# Patient Record
Sex: FEMALE | Race: WHITE | NOT HISPANIC OR LATINO | Employment: FULL TIME | URBAN - METROPOLITAN AREA
[De-identification: names, ages, dates, MRNs, and addresses within clinical notes are randomized per-mention and may not be internally consistent; named-entity substitution may affect disease eponyms.]

---

## 2018-01-11 NOTE — MISCELLANEOUS
Message  Return to work or school:    She is able to return to work on  4/6/16   She is not able to return to work until 4/6/16     Patient will be out of work 4/3-4/4 and 4/5/16 back to work 4/6/16  Signatures   Electronically signed by : LISA Townsend;  Apr 5 2016 12:43PM EST                       (Author)

## 2018-01-11 NOTE — RESULT NOTES
Verified Results  [facility]) Ambiguous Test Order [id number] 12:00AM Mateus Ballesteros     Test Name Result Flag Reference   Ambiguous Test Order Comment     Report delayed in order to contact you to clarify requested test(s)    SPOKE TO OMID ANTOINETTE FROM YOUR FACILITY ON 3-8-2016  ADD TEST  [id number] [facility]

## 2018-01-12 NOTE — MISCELLANEOUS
Message  Return to work or school:   Huey Toney is under my professional care   She was seen in my office on 1/19/16   She is able to return to work on  1/24/16   She is not able to return to work until 1/24/16           Signatures   Electronically signed by : LISA Martinez; Jan 19 2016 12:57PM EST                       (Author)

## 2018-01-12 NOTE — MISCELLANEOUS
Message  Return to work or school:    She is able to return to work on  4/6/16   She is not able to return to work until 4/6/16     Patient will be out of work 4/3-4/4 and 4/5/15 back on 4/6/16  Signatures   Electronically signed by : LISA Merrill;  Apr 5 2016 12:43PM EST                       (Author)

## 2018-01-15 NOTE — MISCELLANEOUS
Message  Return to work or school:    She is able to return to work on  4/6/16   She is not able to return to work until 4/6/16     Patient is under my care from 4-3/4-4 and 4/5/16  LISA Hernandez  Signatures   Electronically signed by : Dedrick Marr, ; Apr 4 2016  4:11PM EST                       (Author)    Electronically signed by : LISA Hernandez;  Apr 5 2016 12:47PM EST                       (Author)

## 2018-01-15 NOTE — MISCELLANEOUS
Message  Return to work or school:    She is able to return to work on  4/22/16   She is not able to return to work until 4/22/16           Signatures   Electronically signed by : LISA Iqbal;  Apr 21 2016  2:48PM EST                       (Author)

## 2018-01-16 NOTE — MISCELLANEOUS
Signatures   Electronically signed by : LISA Gabriel;  Apr 5 2016  8:55AM EST                       (Author)

## 2018-01-17 NOTE — PROGRESS NOTES
Assessment    1  Acute bacterial sinusitis (461 9) (J01 90)   2  Backache (724 5) (M54 9)    Plan  Acute bacterial sinusitis    · Doxycycline Hyclate 100 MG Oral Capsule; TAKE 1 CAPSULE EVERY 12 HOURS  DAILY   · Fluticasone Propionate 50 MCG/ACT Nasal Suspension; USE 2 SPRAYS IN EACH  NOSTRIL ONCE DAILY   · Guaifenesin-Codeine 100-10 MG/5ML Oral Solution; TAKE 10 ML EVERY 4 TO 6  HOURS AS NEEDED   · Apply warm moist compresses to the affected area 3 times a day for 5 minutes ;  Status:Complete;   Done: 76DRN9441   · Drink at least 6 glasses of water or juice a day ; Status:Complete;   Done: 56SXX6129   · How to use a nasal spray:; Status:Complete;   Done: 86PPB3647   · Irrigate your nose twice a day ; Status:Complete;   Done: 82UQW3350   · Taking a hot steamy shower may help your condition  This can be done as often as you  like ; Status:Complete;   Done: 23PUY4457   · Follow Up if Not Better Evaluation and Treatment  Follow-up  Status: Complete  Done:  80ZTB2576   · Call [phone] if: The sinus pain is not better in 5 days ; Status:Complete;   Done:  75GAI9818   · Call [phone] if: Your sinus pain is worse ; Status:Complete;   Done: 50LFT5757  Backache    · Cyclobenzaprine HCl - 10 MG Oral Tablet; TAKE 1 TABLET DAILY  Backache, Lower back pain    · TiZANidine HCl - 4 MG Oral Tablet    Discussion/Summary    Start antibiotic, take with food probiotic nasal spray cough med, supportive care, push fluids rest, return for worsening no improvement , muscle relaxer switched , reassurance provided  The patient was counseled regarding diagnostic results, instructions for management, risk factor reductions, prognosis, patient and family education, impressions, risks and benefits of treatment options, importance of compliance with treatment  Possible side effects of new medications were reviewed with the patient/guardian today  The treatment plan was reviewed with the patient/guardian   The patient/guardian understands and agrees with the treatment plan      Chief Complaint  Sinus congestion      History of Present Illness  HPI: post nasal drip hoarseness cough , non smoker used nyquil       Cold Symptoms: Aleksey Check presents with complaints of cold symptoms  Associated symptoms include sneezing, nasal congestion, post nasal drainage, sore throat, productive cough, facial pressure, facial pain, headache, plugged ear(s) and ear pain, but no fever  Review of Systems    Constitutional: as noted in HPI    ENT: as noted in HPI  Cardiovascular: no complaints of slow or fast heart rate, no chest pain, no palpitations, no leg claudication or lower extremity edema  Respiratory: cough, but no wheezing and no shortness of breath during exertion  Gastrointestinal: no complaints of abdominal pain, no constipation, no nausea or diarrhea, no vomiting, no bloody stools  Genitourinary: no complaints of dysuria, no incontinence, no pelvic pain, no dysmenorrhea, no vaginal discharge or abnormal vaginal bleeding  Musculoskeletal: myalgias and upper back pain  Active Problems    1  Anxiety (300 00) (F41 9)   2  History of Anxiety disorder (300 00) (F41 9)   3  Backache (724 5) (M54 9)   4  Cervical high risk HPV (human papillomavirus) test positive (795 05) (R87 810)   5  Dental abscess (522 5) (K04 7)   6  Encounter for screening mammogram for malignant neoplasm of breast (V76 12)   (Z12 31)   7  Hemorrhoid (455 6) (K64 9)   8  Herpes simplex type 1 infection (054 9) (B00 9)   9  History of Hirsutism (704 1) (L68 0)   10  Insomnia (780 52) (G47 00)   11  LGSIL (low grade squamous intraepithelial dysplasia) (796 9) (R89 6)   12  Lower back pain (724 2) (M54 5)   13  Lump or mass in breast (611 72) (N63)   14  Malaise and fatigue (780 79) (R53 81,R53 83)   15  Post traumatic stress disorder (309 81) (F43 10)   16  History of Post traumatic stress disorder (309 81) (F43 10)   17   Screening for malignant neoplasm of cervix (V76 2) (Z12 4)   18  Spasm of muscle (728 85) (M62 838)   19  History of Symptoms concerning nutrition, metabolism, and development (783 9)    (R63 8)   20  Vitamin B12 deficiency anemia (281 1) (D51 9)    Past Medical History    1  History of Abnormal weight gain (783 1) (R63 5)   2  History of Acute upper respiratory infection (465 9) (J06 9)   3  History of Anxiety disorder (300 00) (F41 9)   4  History of Chronic allergic conjunctivitis (372 14) (H10 45)   5  History of Cough (786 2) (R05)   6  History of Hirsutism (704 1) (L68 0)   7  History of abdominal pain (V13 89) (Z87 898)   8  History of acute bronchitis (V12 69) (Z87 09)   9  History of allergic rhinitis (V12 69) (Z87 09)   10  History of backache (V13 59) (Z87 39)   11  History of blepharitis (V12 49) (Z86 69)   12  History of conjunctivitis (V12 49) (Z86 69)   13  History of insomnia (V13 89) (Z87 898)   14  History of low back pain (V13 59) (Z87 39)   15  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)   16  History of Post traumatic stress disorder (309 81) (F43 10)   17  History of Symptoms concerning nutrition, metabolism, and development (783 9)    (R63 8)  Active Problems And Past Medical History Reviewed: The active problems and past medical history were reviewed and updated today  Family History    1  Family history of    2  Family history of cerebral aneurysm (V17 1) (Z82 49)    3  Family history of Brain Cancer (V16 8)   4  Family history of Breast Cancer (V16 3)  Family History Reviewed: The family history was reviewed and updated today  Social History    · Denied: History of Alcohol Use (History)   · Current Every Day Smoker (305 1)   · Denied: History of Drug use   · Single  The social history was reviewed and updated today  The social history was reviewed and is unchanged  Surgical History    1  History of Biopsy Breast Open   2  History of Colposcopy With Loop Electrode Excision Of The Cervix   3  History of Cryosurgical Ablation Of Fibroadenoma   4  History of Hysteroscopy With Endometrial Ablation   5  History of Tubal Ligation  Surgical History Reviewed: The surgical history was reviewed and updated today  Current Meds   1  Acyclovir 5 % External Ointment; APPLY A SMALL AMOUNT 3 TIMES DAILY AS   DIRECTED; Therapy: 41GKN6573 to (Evaluate:17Nov2014)  Requested for: 46UKZ1024; Last   Rx:27Rac5366 Ordered   2  Allegra-D 12 Hour  MG TB12; TAKE 1 TABLET DAILY AS NEEDED; Therapy: ([id number]) to Recorded   3  ClonazePAM 0 5 MG Oral Tablet; TAKE 1 TABLET EVERY 12 HOURS AS NEEDED; Therapy: 47YJC3209 to (Evaluate:97Bwh0256); Last Rx:16Oct2015 Ordered   4  Cyclobenzaprine HCl - 10 MG Oral Tablet; TAKE 1 TABLET TWICE DAILY  Requested   for: 26Ebf9430; Last Rx:97Jme5595 Ordered   5  Fluticasone Propionate 50 MCG/ACT Nasal Suspension; USE 2 SPRAYS IN EACH   NOSTRIL ONCE DAILY  Requested for: 66NPS3724; Last Rx:06Mar2015 Ordered   6  Ibuprofen TABS; TAKE AS NEEDED; Therapy: (Recorded:69Bio9711) to Recorded   7  MethylPREDNISolone (Dionte) 4 MG Oral Tablet; TAKE AS DIRECTED ON PATIENT   INSTRUCTION CARD; Therapy: 06LHM7712 to (Last Rx:16Oct2015)  Requested for: 16Oct2015 Ordered   8  Polymyxin B-Trimethoprim 40943-5 1 UNIT/ML-% Ophthalmic Solution; INSTILL 2   DROPS INTO AFFECTED EYE(S) 4 TIMES DAILY; Therapy: 72IUY2162 to (Evaluate:26Klv7579)  Requested for: 68VVZ8796; Last   Rx:93Dzk5075 Ordered   9  Refresh Optive SOLN;   Therapy: (BQGXBRLY:19ZZA6851) to Recorded   10  TiZANidine HCl - 4 MG Oral Tablet; TAKE 3 TABLET Bedtime; Therapy: 43VCG6454 to (Last Rx:16Oct2015)  Requested for: 16Oct2015 Ordered   11  Voltaren 1 % Transdermal Gel; Apply topically to affected area twice daily; Therapy: 87JUZ0717 to (Evaluate:49Tck9123)  Requested for: 36YRC9605; Last    Rx:16Oct2015 Ordered    The medication list was reviewed and updated today  Allergies    1  Penicillins   2   Vicodin TABS    Physical Exam    Constitutional   General appearance: Abnormal     Eyes   Conjunctiva and lids: No swelling, erythema or discharge  Ears, Nose, Mouth, and Throat   External inspection of ears and nose: Normal     Otoscopic examination: Abnormal   The right tympanic membrane had a diminished light reflex  The left tympanic membrane had a diminished light reflex  Nasal mucosa, septum, and turbinates: Abnormal   The bilateral nasal mucosa was boggy, edematous and red  Oropharynx: Abnormal   The posterior pharynx was erythematous, had an exudate and mucous  Pulmonary   Respiratory effort: No increased work of breathing or signs of respiratory distress  Auscultation of lungs: Clear to auscultation   (minimal upper airway secretions ) no rhonchi  no wheezing  Cardiovascular   Auscultation of heart: Normal rate and rhythm, normal S1 and S2, without murmurs  Abdomen   Abdomen: Non-tender, no masses  Lymphatic   Palpation of lymph nodes in neck: No lymphadenopathy  Musculoskeletal   Gait and station: Normal     Digits and nails: Normal without clubbing or cyanosis  Inspection/palpation of joints, bones, and muscles: Abnormal   Appearance - normal  Palpation - tenderness (cervical para spinal muscle spasm)  Skin   Skin and subcutaneous tissue: Normal without rashes or lesions  Psychiatric   Orientation to person, place, and time: Normal     Mood and affect: Normal          Signatures   Electronically signed by : LISA Morgan; Jan 20 2016  6:32PM EST                       (Author)    Electronically signed by :  Trinidad Caldera DO; Jan 24 2016  7:59PM EST

## 2018-01-18 NOTE — RESULT NOTES
Verified Results  (LC) EBV, Chronic/Active Infection [id number] 12:00AM Hay Feliz     Test Name Result Flag Reference   EBV Early Antigen Ab, IgG <9 0 U/mL  0 0-8 9   Negative        < 9 0                                                  Equivocal  9 0 - 10 9                                                  Positive        >10 9   EBV Ab VCA, IgG 262 0 U/mL H 0 0-17 9   Negative        <18 0                                                  Equivocal 18 0 - 21 9                                                  Positive        >21 9   EBV Nuclear Antigen Ab, IgG 383 0 U/mL H 0 0-17 9   Negative        <18 0                                                  Equivocal 18 0 - 21 9                                                  Positive        >21 9   Interpretation: Comment     EBV Interpretation Chart                 Interpretation   EBV-IgM  EA(D)-IgG  VCA-IgG  EBNA-IgG                 EBV Seronegative    -        -         -          -                 Early Phase         +        -         -          -                 Acute Primary       +       +or-       +          -                 Infection                 Convalescence/Past  -       +or-       +          +                 Infection                 Reactivated        +or-      +         +          +                 Infection                        + Antibody Present      - Antibody Absent

## 2020-01-31 ENCOUNTER — OFFICE VISIT (OUTPATIENT)
Dept: OBGYN CLINIC | Facility: CLINIC | Age: 45
End: 2020-01-31
Payer: COMMERCIAL

## 2020-01-31 VITALS — WEIGHT: 239 LBS | SYSTOLIC BLOOD PRESSURE: 120 MMHG | BODY MASS INDEX: 35.29 KG/M2 | DIASTOLIC BLOOD PRESSURE: 70 MMHG

## 2020-01-31 DIAGNOSIS — N76.0 BACTERIAL VAGINOSIS: Primary | ICD-10-CM

## 2020-01-31 DIAGNOSIS — B96.89 BACTERIAL VAGINOSIS: Primary | ICD-10-CM

## 2020-01-31 PROCEDURE — 99202 OFFICE O/P NEW SF 15 MIN: CPT | Performed by: NURSE PRACTITIONER

## 2020-01-31 RX ORDER — METRONIDAZOLE 7.5 MG/G
1 GEL VAGINAL
Qty: 70 G | Refills: 0 | Status: SHIPPED | OUTPATIENT
Start: 2020-01-31 | End: 2020-02-05

## 2020-01-31 NOTE — ASSESSMENT & PLAN NOTE
Wet mount clue cells presents, absent yeast and trich  Rx for Metrogel nightly x 5 nights sent to pharmacy  Will call with results and follow up accordingly  Reviewed loose cotton clothing  Loose cotton underwear, avoid thongs  Change promptly out of wet bathing suits or sweaty clothing  No scented products vaginally  Acidophilous probiotic daily  RTO annual exam or sooner as needed

## 2020-01-31 NOTE — PROGRESS NOTES
Assessment/Plan:    Bacterial vaginosis  Wet mount clue cells presents, absent yeast and trich  Rx for Metrogel nightly x 5 nights sent to pharmacy  Will call with results and follow up accordingly  Reviewed loose cotton clothing  Loose cotton underwear, avoid thongs  Change promptly out of wet bathing suits or sweaty clothing  No scented products vaginally  Acidophilous probiotic daily  RTO annual exam or sooner as needed  Diagnoses and all orders for this visit:    Bacterial vaginosis  -     metroNIDAZOLE (METROGEL) 0 75 % vaginal gel; Insert 1 application into the vagina daily at bedtime for 5 days          Subjective:      Patient ID: Gloria Smith is a 50 y o  female  Pt presents as new patient with complaints of vaginal infection  Symptoms began a few weeks ago  Tried Acidophilous but has not been helping  Gets Bacterial vaginosis frequently      + Itching  - Burning  + Abnormal discharge  + Odor  - Pelvic pain  - Irregular bleeding    Denies any new products vaginally  Would like STD testing  LMP: 2005  Had endometrial ablation due to menorrhagia  Last annual exam and pap smear; 2015    OB History  G 4 P 4    History of genital warts in 2015, thinks started coming back  The following portions of the patient's history were reviewed and updated as appropriate: allergies, current medications, past family history, past medical history, past social history, past surgical history and problem list     Review of Systems   All other systems reviewed and are negative  Objective:      /70   Wt 108 kg (239 lb)   LMP 01/31/2005 Comment: ETA-Ablation   Breastfeeding No   BMI 35 29 kg/m²          Physical Exam   Constitutional: She is oriented to person, place, and time  She appears well-developed and well-nourished  HENT:   Head: Normocephalic and atraumatic  Cardiovascular: Normal rate, regular rhythm and normal heart sounds     Pulmonary/Chest: Effort normal and breath sounds normal    Abdominal: Soft  Bowel sounds are normal  She exhibits no distension and no mass  There is no tenderness  There is no rebound and no guarding  Genitourinary: Vagina normal and uterus normal  No labial fusion  There is no rash, tenderness, lesion or injury on the right labia  There is no rash, tenderness, lesion or injury on the left labia  Cervix exhibits no motion tenderness, no discharge and no friability  Right adnexum displays no mass, no tenderness and no fullness  Left adnexum displays no mass, no tenderness and no fullness  Musculoskeletal: Normal range of motion  Neurological: She is alert and oriented to person, place, and time  Skin: Skin is warm and dry  Psychiatric: She has a normal mood and affect   Her behavior is normal  Judgment and thought content normal

## 2020-02-27 ENCOUNTER — OFFICE VISIT (OUTPATIENT)
Dept: FAMILY MEDICINE CLINIC | Facility: CLINIC | Age: 45
End: 2020-02-27
Payer: COMMERCIAL

## 2020-02-27 VITALS
TEMPERATURE: 98.8 F | OXYGEN SATURATION: 97 % | HEART RATE: 71 BPM | DIASTOLIC BLOOD PRESSURE: 82 MMHG | HEIGHT: 68 IN | RESPIRATION RATE: 16 BRPM | SYSTOLIC BLOOD PRESSURE: 120 MMHG | WEIGHT: 239.6 LBS | BODY MASS INDEX: 36.31 KG/M2

## 2020-02-27 DIAGNOSIS — Z13.29 SCREENING FOR THYROID DISORDER: ICD-10-CM

## 2020-02-27 DIAGNOSIS — F41.8 DEPRESSION WITH ANXIETY: ICD-10-CM

## 2020-02-27 DIAGNOSIS — B96.89 BACTERIAL VAGINOSIS: ICD-10-CM

## 2020-02-27 DIAGNOSIS — L70.9 ACNE, UNSPECIFIED ACNE TYPE: ICD-10-CM

## 2020-02-27 DIAGNOSIS — Z13.6 SCREENING FOR CARDIOVASCULAR CONDITION: ICD-10-CM

## 2020-02-27 DIAGNOSIS — N76.0 BACTERIAL VAGINOSIS: ICD-10-CM

## 2020-02-27 DIAGNOSIS — Z00.00 ANNUAL PHYSICAL EXAM: Primary | ICD-10-CM

## 2020-02-27 DIAGNOSIS — Z11.4 SCREENING FOR HIV (HUMAN IMMUNODEFICIENCY VIRUS): ICD-10-CM

## 2020-02-27 PROCEDURE — 99386 PREV VISIT NEW AGE 40-64: CPT | Performed by: NURSE PRACTITIONER

## 2020-02-27 RX ORDER — CLINDAMYCIN AND BENZOYL PEROXIDE 10; 50 MG/G; MG/G
GEL TOPICAL 2 TIMES DAILY
Qty: 50 G | Refills: 1 | Status: SHIPPED | OUTPATIENT
Start: 2020-02-27

## 2020-02-27 RX ORDER — METRONIDAZOLE 500 MG/1
500 TABLET ORAL EVERY 12 HOURS SCHEDULED
Qty: 14 TABLET | Refills: 0 | Status: SHIPPED | OUTPATIENT
Start: 2020-02-27 | End: 2020-03-05

## 2020-02-27 NOTE — PATIENT INSTRUCTIONS
Wellness Visit for Adults   WHAT YOU NEED TO KNOW:   What is a wellness visit? A wellness visit is when you see your healthcare provider to get screened for health problems  You can also get advice on how to stay healthy  Write down your questions so you remember to ask them  Ask your healthcare provider how often you should have a wellness visit  What happens at a wellness visit? Your healthcare provider will ask about your health, and your family history of health problems  This includes high blood pressure, heart disease, and cancer  He or she will ask if you have symptoms that concern you, if you smoke, and about your mood  You may also be asked about your intake of medicines, supplements, food, and alcohol  Any of the following may be done:  · Your weight  will be checked  Your height may also be checked so your body mass index (BMI) can be calculated  Your BMI shows if you are at a healthy weight  · Your blood pressure  and heart rate will be checked  Your temperature may also be checked  · Blood and urine tests  may be done  Blood tests may be done to check your cholesterol levels  Abnormal cholesterol levels increase your risk for heart disease and stroke  You may also need a blood or urine test to check for diabetes if you are at increased risk  Urine tests may be done to look for signs of an infection or kidney disease  · A physical exam  includes checking your heartbeat and lungs with a stethoscope  Your healthcare provider may also check your skin to look for sun damage  · Screening tests  may be recommended  A screening test is done to check for diseases that may not cause symptoms  The screening tests you may need depend on your age, gender, family history, and lifestyle habits  For example, colorectal screening may be recommended if you are 48years old or older  What screening tests do I need if I am a woman? · A Pap smear  is used to screen for cervical cancer   Pap smears are usually done every 3 to 5 years depending on your age  You may need them more often if you have had abnormal Pap smear test results in the past  Ask your healthcare provider how often you should have a Pap smear  · A mammogram  is an x-ray of your breasts to screen for breast cancer  Experts recommend mammograms every 2 years starting at age 48 years  You may need a mammogram at age 52 years or younger if you have an increased risk for breast cancer  Talk to your healthcare provider about when you should start having mammograms and how often you need them  What vaccines might I need? · Get an influenza vaccine  every year  The influenza vaccine protects you from the flu  Several types of viruses cause the flu  The viruses change over time, so new vaccines are made each year  · Get a tetanus-diphtheria (Td) booster vaccine  every 10 years  This vaccine protects you against tetanus and diphtheria  Tetanus is a severe infection that may cause painful muscle spasms and lockjaw  Diphtheria is a severe bacterial infection that causes a thick covering in the back of your mouth and throat  · Get a human papillomavirus (HPV) vaccine  if you are female and aged 23 to 32 or male 23 to 24 and never received it  This vaccine protects you from HPV infection  HPV is the most common infection spread by sexual contact  HPV may also cause vaginal, penile, and anal cancers  · Get a pneumococcal vaccine  if you are aged 72 years or older  The pneumococcal vaccine is an injection given to protect you from pneumococcal disease  Pneumococcal disease is an infection caused by pneumococcal bacteria  The infection may cause pneumonia, meningitis, or an ear infection  · Get a shingles vaccine  if you are aged 61 or older, even if you have had shingles before  The shingles vaccine is an injection to protect you from the varicella-zoster virus  This is the same virus that causes chickenpox   Shingles is a painful rash that develops in people who had chickenpox or have been exposed to the virus  How can I eat healthy? My Plate is a model for planning healthy meals  It shows the types and amounts of foods that should go on your plate  Fruits and vegetables make up about half of your plate, and grains and protein make up the other half  A serving of dairy is included on the side of your plate  The amount of calories and serving sizes you need depends on your age, gender, weight, and height  Examples of healthy foods are listed below:  · Eat a variety of vegetables  such as dark green, red, and orange vegetables  You can also include canned vegetables low in sodium (salt) and frozen vegetables without added butter or sauces  · Eat a variety of fresh fruits , canned fruit in 100% juice, frozen fruit, and dried fruit  · Include whole grains  At least half of the grains you eat should be whole grains  Examples include whole-wheat bread, wheat pasta, brown rice, and whole-grain cereals such as oatmeal     · Eat a variety of protein foods such as seafood (fish and shellfish), lean meat, and poultry without skin (turkey and chicken)  Examples of lean meats include pork leg, shoulder, or tenderloin, and beef round, sirloin, tenderloin, and extra lean ground beef  Other protein foods include eggs and egg substitutes, beans, peas, soy products, nuts, and seeds  · Choose low-fat dairy products such as skim or 1% milk or low-fat yogurt, cheese, and cottage cheese  · Limit unhealthy fats  such as butter, hard margarine, and shortening  How much exercise do I need? Exercise at least 30 minutes per day on most days of the week  Some examples of exercise include walking, biking, dancing, and swimming  You can also fit in more physical activity by taking the stairs instead of the elevator or parking farther away from stores  Include muscle strengthening activities 2 days each week  Regular exercise provides many health benefits  It helps you manage your weight, and decreases your risk for type 2 diabetes, heart disease, stroke, and high blood pressure  Exercise can also help improve your mood  Ask your healthcare provider about the best exercise plan for you  What are some general health and safety guidelines I should follow? · Do not smoke  Nicotine and other chemicals in cigarettes and cigars can cause lung damage  Ask your healthcare provider for information if you currently smoke and need help to quit  E-cigarettes or smokeless tobacco still contain nicotine  Talk to your healthcare provider before you use these products  · Limit alcohol  A drink of alcohol is 12 ounces of beer, 5 ounces of wine, or 1½ ounces of liquor  · Lose weight, if needed  Being overweight increases your risk of certain health conditions  These include heart disease, high blood pressure, type 2 diabetes, and certain types of cancer  · Protect your skin  Do not sunbathe or use tanning beds  Use sunscreen with a SPF 15 or higher  Apply sunscreen at least 15 minutes before you go outside  Reapply sunscreen every 2 hours  Wear protective clothing, hats, and sunglasses when you are outside  · Drive safely  Always wear your seatbelt  Make sure everyone in your car wears a seatbelt  A seatbelt can save your life if you are in an accident  Do not use your cell phone when you are driving  This could distract you and cause an accident  Pull over if you need to make a call or send a text message  · Practice safe sex  Use latex condoms if are sexually active and have more than one partner  Your healthcare provider may recommend screening tests for sexually transmitted infections (STIs)  · Wear helmets, lifejackets, and protective gear  Always wear a helmet when you ride a bike or motorcycle, go skiing, or play sports that could cause a head injury  Wear protective equipment when you play sports   Wear a lifejacket when you are on a boat or doing water sports  CARE AGREEMENT:   You have the right to help plan your care  Learn about your health condition and how it may be treated  Discuss treatment options with your caregivers to decide what care you want to receive  You always have the right to refuse treatment  The above information is an  only  It is not intended as medical advice for individual conditions or treatments  Talk to your doctor, nurse or pharmacist before following any medical regimen to see if it is safe and effective for you  © 2017 [address] [employer] Information is for End User's use only and may not be sold, redistributed or otherwise used for commercial purposes  All illustrations and images included in CareNotes® are the copyrighted property of [employer]  or [employer]

## 2020-02-27 NOTE — PROGRESS NOTES
FAMILY PRACTICE HEALTH MAINTENANCE OFFICE VISIT  [facility] - [facility]    NAME: Gloria Smith  AGE: 50 y o  SEX: female  : 1975     DATE: 2020    Assessment and Plan   Will do blood work and will call with results  Will follow up with gynecologist for yearly  Checked on good Rx and flagyl tabs only $8 and made aware about possible complications as vaginosis is not improved and cannot afford gel  1  Annual physical exam  -     Comprehensive metabolic panel; Future  -     CBC and Platelet; Future  -     Comprehensive metabolic panel  -     CBC and Platelet    2  Screening for cardiovascular condition  -     Comprehensive metabolic panel; Future  -     Lipid Panel with Direct LDL reflex; Future  -     Comprehensive metabolic panel  -     Lipid Panel with Direct LDL reflex    3  Screening for thyroid disorder  -     TSH, 3rd generation with Free T4 reflex; Future  -     TSH, 3rd generation with Free T4 reflex    4  Depression with anxiety  -     Comprehensive metabolic panel; Future  -     CBC and Platelet; Future  -     TSH, 3rd generation with Free T4 reflex; Future  -     Comprehensive metabolic panel  -     CBC and Platelet  -     TSH, 3rd generation with Free T4 reflex    5  Screening for HIV (human immunodeficiency virus)  -     [facility]- Human Immunodeficiency Virus 1/2 Antigen / Antibody ( Fourth Generation) with Reflex Testing; Future    6  Acne, unspecified acne type  -     clindamycin-benzoyl peroxide (BENZACLIN) gel; Apply topically 2 (two) times a day    7  Bacterial vaginosis  -     metroNIDAZOLE (FLAGYL) 500 mg tablet;  Take 1 tablet (500 mg total) by mouth every 12 (twelve) hours for 7 days        · Patient Counseling:   · Nutrition: Stressed importance of a well balanced diet, moderation of sodium/saturated fat, caloric balance and sufficient intake of fiber  · Exercise: Stressed the importance of regular exercise with a goal of 150 minutes per week  · Dental Health: Discussed daily flossing and brushing and regular dental visits   · Sexuality: Discussed sexually transmitted infections, use of condoms and prevention of unintended pregnancy  · Alcohol Use:  Recommended moderation of alcohol intake  · Injury Prevention: Discussed Safety Belts, Safety Helmets, and Smoke Detectors    · Immunizations reviewed: Declined recommended vaccinations  · Discussed benefits of:  Mammogram , Cervical Cancer screening and Screening labs   BMI Counseling: Body mass index is 36 97 kg/m²  Discussed with patient's BMI with her  The BMI is above normal  Nutrition recommendations include reducing portion sizes, decreasing overall calorie intake, 3-5 servings of fruits/vegetables daily, reducing fast food intake, consuming healthier snacks, decreasing soda and/or juice intake, moderation in carbohydrate intake, increasing intake of lean protein, reducing intake of saturated fat and trans fat and reducing intake of cholesterol  Exercise recommendations include exercising 3-5 times per week, joining a gym and strength training exercises  Return in about 1 year (around 2/27/2021) for Annual physical         Chief Complaint   No chief complaint on file  History of Present Illness     HPI  New to practice  Personal and family medical history reviewed  Quit smoking in June 2019  Under care of therapy currently and will be seeing psychiatrist at [facility] on 3/27/2020  Stated that did not have insurance for couple of years and not uptodate on anything  Went recently to gynecologist for bacterial vaginosis but could not afford flagyl get as costs$100          Well Adult Physical   Patient here for a comprehensive physical exam       Diet and Physical Activity  Diet: well balanced diet  Exercise: occasionally      Depression Screen  PHQ-9 Depression Screening    PHQ-9:    Frequency of the following problems over the past two weeks:       Little interest or pleasure in doing things:  0 - not at all  Feeling down, depressed, or hopeless:  1 - several days  PHQ-2 Score:  1          General Health  Hearing: Normal:  bilateral  Vision: no vision problems, most recent eye exam >1 year and wears glasses  last exam was in 2013  Vision screening discussed and will follow up with opthalmologist  Dental: no dental visits for >1 year and brushes teeth twice daily    Reproductive Health  Follows with gynecologist and no menstruation and had ablation in 2019   Denies being sexually active  The following portions of the patient's history were reviewed and updated as appropriate: allergies, current medications, past family history, past medical history, past social history, past surgical history and problem list     Review of Systems     Review of Systems   Constitutional: Negative  HENT: Negative  Eyes: Negative  Respiratory: Negative  Cardiovascular: Negative  Gastrointestinal: Negative  Endocrine: Negative  Genitourinary: Negative  Musculoskeletal: Negative  Skin: Negative  Allergic/Immunologic: Negative  Neurological: Negative  Hematological: Negative  Psychiatric/Behavioral: Negative  Past Medical History     Past Medical History:   Diagnosis Date    Abnormal Pap smear of cervix 2015    Blepharitis     Breast lump     last assessed: 3/18/2014    Depression 2011    EBV seropositivity     last assessed: 3/18/2016    Herpes simplex infection     last assessed: 8/19/2014    Hirsutism     HPV (human papilloma virus) infection 2015    Malaise and fatigue     last assessed: 3/18/2016    Multiple joint pain     last assessed: 3/1/2016    Myalgia     last assessed: 3/1/2016    Trauma     Since childhood       Past Surgical History     Past Surgical History:   Procedure Laterality Date    BREAST BIOPSY  2010    Benign    BREAST FIBROADENOMA SURGERY      COLONOSCOPY  2004    COLPOSCOPY  2003 ? ?     Hemorrhoids    HYSTEROSCOPY W/ ENDOMETRIAL ABLATION      TOOTH EXTRACTION      TUBAL LIGATION         Social History     Social History     Socioeconomic History    Marital status: Single     Spouse name: None    Number of children: None    Years of education: None    Highest education level: None   Occupational History    None   Social Needs    Financial resource strain: None    Food insecurity:     Worry: None     Inability: None    Transportation needs:     Medical: None     Non-medical: None   Tobacco Use    Smoking status: Former Smoker     Packs/day: 1 00     Years: 15 00     Pack years: 15 00     Types: Cigarettes     Start date: 10/31/1991     Last attempt to quit: 2019     Years since quittin 6    Smokeless tobacco: Never Used   Substance and Sexual Activity    Alcohol use:  Yes     Alcohol/week: 7 0 standard drinks     Types: 7 Glasses of wine per week    Drug use: Never    Sexual activity: Not Currently     Partners: Male     Birth control/protection: None   Lifestyle    Physical activity:     Days per week: None     Minutes per session: None    Stress: None   Relationships    Social connections:     Talks on phone: None     Gets together: None     Attends Confucianist service: None     Active member of club or organization: None     Attends meetings of clubs or organizations: None     Relationship status: None    Intimate partner violence:     Fear of current or ex partner: None     Emotionally abused: None     Physically abused: None     Forced sexual activity: None   Other Topics Concern    None   Social History Narrative    None       Family History     Family History   Problem Relation Age of Onset    Breast cancer Maternal Grandmother     Arthritis Maternal Grandmother     Cerebral aneurysm Mother     Brain cancer Family        Current Medications       Current Outpatient Medications:     clindamycin-benzoyl peroxide (BENZACLIN) gel, Apply topically 2 (two) times a day, Disp: 50 g, Rfl: 1    metroNIDAZOLE (FLAGYL) 500 mg tablet, Take 1 tablet (500 mg total) by mouth every 12 (twelve) hours for 7 days, Disp: 14 tablet, Rfl: 0     Allergies     Allergies   Allergen Reactions    Hydrocodone-Acetaminophen Abdominal Pain    Penicillins Itching       Objective     /82   Pulse 71   Temp 98 8 °F (37 1 °C)   Resp 16   Ht 5' 7 5" (1 715 m)   Wt 109 kg (239 lb 9 6 oz)   LMP 01/31/2005 Comment: ETA-Ablation   SpO2 97%   BMI 36 97 kg/m²      Physical Exam   Constitutional: She is oriented to person, place, and time  She appears well-developed and well-nourished  HENT:   Head: Normocephalic  Right Ear: Tympanic membrane, external ear and ear canal normal    Left Ear: Tympanic membrane, external ear and ear canal normal    Nose: Nose normal  Right sinus exhibits no maxillary sinus tenderness and no frontal sinus tenderness  Left sinus exhibits no maxillary sinus tenderness and no frontal sinus tenderness  Mouth/Throat: Oropharynx is clear and moist and mucous membranes are normal    Eyes: Pupils are equal, round, and reactive to light  Conjunctivae and lids are normal    Neck: Normal range of motion and full passive range of motion without pain  Neck supple  Carotid bruit is not present  No thyromegaly present  Cardiovascular: Normal rate, regular rhythm, normal heart sounds and intact distal pulses  No murmur heard  Pulses:       Radial pulses are 2+ on the right side, and 2+ on the left side  Femoral pulses are 2+ on the right side, and 2+ on the left side  Dorsalis pedis pulses are 2+ on the right side, and 2+ on the left side  Posterior tibial pulses are 2+ on the right side, and 2+ on the left side  Pulmonary/Chest: Effort normal and breath sounds normal    Abdominal: Soft  Normal appearance and bowel sounds are normal  There is no hepatomegaly  There is no tenderness  There is no rebound and no CVA tenderness  No hernia   Hernia confirmed negative in the ventral area, confirmed negative in the right inguinal area and confirmed negative in the left inguinal area  Genitourinary:   Genitourinary Comments:  and breast exam deferred as stated that scheduled for physical with her gynecologist     Musculoskeletal: Normal range of motion  She exhibits no edema, tenderness or deformity  Lymphadenopathy:        Right cervical: No superficial cervical and no posterior cervical adenopathy present  Left cervical: No superficial cervical and no posterior cervical adenopathy present  She has no axillary adenopathy  No inguinal adenopathy noted on the right or left side  Right: No inguinal and no supraclavicular adenopathy present  Left: No inguinal and no supraclavicular adenopathy present  Neurological: She is alert and oriented to person, place, and time  She has normal strength and normal reflexes  No sensory deficit  Coordination and gait normal  GCS eye subscore is 4  GCS verbal subscore is 5  GCS motor subscore is 6  Skin: Skin is warm, dry and intact  Psychiatric: She has a normal mood and affect  Her speech is normal and behavior is normal  Judgment and thought content normal  Cognition and memory are normal    Vitals reviewed           Visual Acuity Screening    Right eye Left eye Both eyes   Without correction:      With correction: 20/25 20/25 20/20           [address]

## 2020-03-20 ENCOUNTER — TELEMEDICINE (OUTPATIENT)
Dept: FAMILY MEDICINE CLINIC | Facility: CLINIC | Age: 45
End: 2020-03-20
Payer: COMMERCIAL

## 2020-03-20 DIAGNOSIS — H01.003 BLEPHARITIS OF BOTH EYES, UNSPECIFIED EYELID, UNSPECIFIED TYPE: ICD-10-CM

## 2020-03-20 DIAGNOSIS — N76.0 BACTERIAL VAGINOSIS: Primary | ICD-10-CM

## 2020-03-20 DIAGNOSIS — B96.89 BACTERIAL VAGINOSIS: Primary | ICD-10-CM

## 2020-03-20 DIAGNOSIS — F41.9 ANXIETY: ICD-10-CM

## 2020-03-20 DIAGNOSIS — H01.006 BLEPHARITIS OF BOTH EYES, UNSPECIFIED EYELID, UNSPECIFIED TYPE: ICD-10-CM

## 2020-03-20 PROCEDURE — 1036F TOBACCO NON-USER: CPT | Performed by: NURSE PRACTITIONER

## 2020-03-20 PROCEDURE — 99213 OFFICE O/P EST LOW 20 MIN: CPT | Performed by: NURSE PRACTITIONER

## 2020-03-20 RX ORDER — METRONIDAZOLE 7.5 MG/G
1 GEL VAGINAL DAILY
Qty: 70 G | Refills: 0 | Status: SHIPPED | OUTPATIENT
Start: 2020-03-20 | End: 2020-03-25

## 2020-03-20 RX ORDER — HYDROXYZINE HYDROCHLORIDE 25 MG/1
TABLET, FILM COATED ORAL
Qty: 60 TABLET | Refills: 0 | Status: SHIPPED | OUTPATIENT
Start: 2020-03-20 | End: 2020-05-08 | Stop reason: ALTCHOICE

## 2020-03-20 RX ORDER — MOXIFLOXACIN 5 MG/ML
1 SOLUTION/ DROPS OPHTHALMIC 3 TIMES DAILY
Qty: 3 ML | Refills: 0 | Status: SHIPPED | OUTPATIENT
Start: 2020-03-20 | End: 2020-03-27

## 2020-03-20 NOTE — PROGRESS NOTES
Virtual Brief Visit    Reason for visit is vaginal discharge and eye issues and also anxiety      Encounter provider HEMALATHA Balderas    Provider located at [address]  [address] [facility]  [address]  [place] [address]      Recent Visits  No visits were found meeting these conditions  Showing recent visits within past 7 days and meeting all other requirements     Future Appointments  No visits were found meeting these conditions  Showing future appointments within next 150 days and meeting all other requirements        Patient agrees to participate in a virtual check in via telephone or video visit instead of presenting to the office to address urgent/immediate medical needs  Patient is aware this is a billable service  After connecting through telephone, the patient was identified by name and date of birth  Lary Elmore was informed that this was a telemedicine visit and that the visit is being conducted through telephone which may not be secure and therefore might not be HIPAA-compliant  My office door was closed  No one else was in the room  She acknowledged consent and understanding of privacy and security of the virtual check-in visit  I informed the patient that I have reviewed her record in Epic and presented the opportunity for her to ask any questions regarding the visit today  The patient initiated communication and agreed to participate  Subjective  Lary Elmore is a 50 y o  female stated that was seen by gynecologist on 1/31/20 and was diagnosed with bacterial vaginosis and flagyl cream was prescribed but did not get it as had the insurance issue  Then patient was seen in my office on 02/27/2020, I prescribed flagyl tablets but did not get them either  Taking acidophilus to help with vaginal discharge but still having having same amount of vaginal discharge, vaginal itching and smell from vaginal discharge   Stated that now, can afford prescription and wants to try flagyl cream   Stated that also having blepharitis symptoms as prone to get them and having some irritation with some discharge yellowish color in morning but denies any vision changes, fever, chills  Also have dry eyes  Stated that was supposed to see psychiatrist on 3/17/20 for anxiety but cancelled appt and rescheduled in end of April and stated that wants something short term for anxiety until sees her psychiatrist   Denies any chest pain, sob and panic attacks  Review of Systems   Constitutional: Negative  Negative for chills, diaphoresis, fatigue, fever and unexpected weight change  HENT: Negative for congestion, dental problem, drooling, ear discharge, ear pain, facial swelling, hearing loss, mouth sores, nosebleeds, postnasal drip, rhinorrhea, sinus pressure, sinus pain, sneezing, sore throat, tinnitus, trouble swallowing and voice change  Eyes: Positive for discharge  Negative for photophobia, pain, redness, itching and visual disturbance  As noted in HPI     Respiratory: Negative  Negative for cough, chest tightness, shortness of breath and wheezing  Cardiovascular: Negative  Gastrointestinal: Negative  Negative for abdominal pain, constipation, diarrhea, nausea and vomiting  Genitourinary: Positive for vaginal discharge  Negative for dysuria, flank pain, frequency, genital sores, hematuria and urgency  As noted in HPI     Musculoskeletal: Negative  Skin: Negative  Neurological: Negative  Negative for dizziness, weakness, light-headedness and headaches  Hematological: Negative  Psychiatric/Behavioral: Negative for agitation, behavioral problems, confusion, decreased concentration, dysphoric mood, hallucinations, self-injury, sleep disturbance and suicidal ideas  The patient is nervous/anxious  The patient is not hyperactive          Past Medical History:   Diagnosis Date    Abnormal Pap smear of cervix 2015    Blepharitis     Breast lump     last assessed: 3/18/2014    Depression 2011    EBV seropositivity     last assessed: 3/18/2016    Herpes simplex infection     last assessed: 8/19/2014    Hirsutism     HPV (human papilloma virus) infection 2015    Malaise and fatigue     last assessed: 3/18/2016    Multiple joint pain     last assessed: 3/1/2016    Myalgia     last assessed: 3/1/2016    Trauma     Since childhood       Past Surgical History:   Procedure Laterality Date    BREAST BIOPSY  2010    Benign    BREAST FIBROADENOMA SURGERY      COLONOSCOPY  2004    COLPOSCOPY  2003 ? ? Hemorrhoids    HYSTEROSCOPY W/ ENDOMETRIAL ABLATION      TOOTH EXTRACTION      TUBAL LIGATION         Current Outpatient Medications   Medication Sig Dispense Refill    clindamycin-benzoyl peroxide (BENZACLIN) gel Apply topically 2 (two) times a day 50 g 1    hydrOXYzine HCL (ATARAX) 25 mg tablet 1-2 po q6hrs prn anxiety/insomnia 60 tablet 0    metroNIDAZOLE (METROGEL) 0 75 % vaginal gel Insert 1 application into the vagina daily for 5 days 70 g 0    moxifloxacin (VIGAMOX) 0 5 % ophthalmic solution Administer 1 drop to both eyes 3 (three) times a day for 7 days 3 mL 0     No current facility-administered medications for this visit  Allergies   Allergen Reactions    Hydrocodone-Acetaminophen Abdominal Pain    Penicillins Itching       Assessment    Deepa telephone assessment is  calm and was making appropritae converstaion by appropriately answering    Symptoms consistent with BV but never treated it due to financial issues and will send flagyl vaginal gel and usage and instructions discussed  Advised to use refreesh tears for dry eyes and when situation favour, will follow up with opthalmologist   Will start on atarax for anxiety as needed  1  Bacterial vaginosis  -     metroNIDAZOLE (METROGEL) 0 75 % vaginal gel; Insert 1 application into the vagina daily for 5 days    2   Blepharitis of both eyes, unspecified eyelid, unspecified type  - moxifloxacin (VIGAMOX) 0 5 % ophthalmic solution; Administer 1 drop to both eyes 3 (three) times a day for 7 days    3   Anxiety  -     hydrOXYzine HCL (ATARAX) 25 mg tablet; 1-2 po q6hrs prn anxiety/insomnia

## 2020-05-08 ENCOUNTER — TELEMEDICINE (OUTPATIENT)
Dept: FAMILY MEDICINE CLINIC | Facility: CLINIC | Age: 45
End: 2020-05-08
Payer: COMMERCIAL

## 2020-05-08 DIAGNOSIS — M54.42 ACUTE LEFT-SIDED LOW BACK PAIN WITH LEFT-SIDED SCIATICA: Primary | ICD-10-CM

## 2020-05-08 PROCEDURE — 99213 OFFICE O/P EST LOW 20 MIN: CPT | Performed by: NURSE PRACTITIONER

## 2020-05-08 RX ORDER — FLUOXETINE 10 MG/1
1 CAPSULE ORAL DAILY
COMMUNITY
Start: 2020-05-05

## 2020-05-08 RX ORDER — ARIPIPRAZOLE 10 MG/1
1 TABLET ORAL DAILY
COMMUNITY
Start: 2020-04-08

## 2020-05-08 RX ORDER — TRAZODONE HYDROCHLORIDE 50 MG/1
1 TABLET ORAL DAILY PRN
COMMUNITY
Start: 2020-05-05

## 2020-05-08 RX ORDER — CYCLOBENZAPRINE HCL 10 MG
10 TABLET ORAL
Qty: 20 TABLET | Refills: 0 | Status: SHIPPED | OUTPATIENT
Start: 2020-05-08 | End: 2020-06-03 | Stop reason: ALTCHOICE

## 2020-05-08 RX ORDER — METHYLPREDNISOLONE 4 MG/1
TABLET ORAL
Qty: 21 TABLET | Refills: 0 | Status: SHIPPED | OUTPATIENT
Start: 2020-05-08 | End: 2020-06-03 | Stop reason: ALTCHOICE

## 2020-05-19 DIAGNOSIS — Z86.19 H/O COLD SORES: Primary | ICD-10-CM

## 2020-05-19 RX ORDER — ACYCLOVIR 50 MG/G
OINTMENT TOPICAL
Qty: 30 G | Refills: 0 | Status: SHIPPED | OUTPATIENT
Start: 2020-05-19

## 2020-05-19 RX ORDER — VALACYCLOVIR HYDROCHLORIDE 1 G/1
TABLET, FILM COATED ORAL
Qty: 20 TABLET | Refills: 0 | Status: SHIPPED | OUTPATIENT
Start: 2020-05-19 | End: 2020-05-19 | Stop reason: ALTCHOICE

## 2020-05-20 ENCOUNTER — APPOINTMENT (OUTPATIENT)
Dept: RADIOLOGY | Facility: CLINIC | Age: 45
End: 2020-05-20
Payer: COMMERCIAL

## 2020-05-20 ENCOUNTER — OFFICE VISIT (OUTPATIENT)
Dept: OBGYN CLINIC | Facility: CLINIC | Age: 45
End: 2020-05-20
Payer: COMMERCIAL

## 2020-05-20 VITALS
DIASTOLIC BLOOD PRESSURE: 70 MMHG | SYSTOLIC BLOOD PRESSURE: 119 MMHG | WEIGHT: 250.6 LBS | HEART RATE: 70 BPM | TEMPERATURE: 98.7 F | HEIGHT: 68 IN | BODY MASS INDEX: 37.98 KG/M2

## 2020-05-20 DIAGNOSIS — M25.512 ACUTE PAIN OF LEFT SHOULDER: ICD-10-CM

## 2020-05-20 DIAGNOSIS — M24.812 INTERNAL DERANGEMENT OF LEFT SHOULDER: Primary | ICD-10-CM

## 2020-05-20 DIAGNOSIS — G56.01 CARPAL TUNNEL SYNDROME ON RIGHT: ICD-10-CM

## 2020-05-20 PROCEDURE — 73030 X-RAY EXAM OF SHOULDER: CPT

## 2020-05-20 PROCEDURE — 1036F TOBACCO NON-USER: CPT | Performed by: ORTHOPAEDIC SURGERY

## 2020-05-20 PROCEDURE — 99214 OFFICE O/P EST MOD 30 MIN: CPT | Performed by: ORTHOPAEDIC SURGERY

## 2020-05-20 PROCEDURE — 3008F BODY MASS INDEX DOCD: CPT | Performed by: ORTHOPAEDIC SURGERY

## 2020-05-23 LAB
ALBUMIN SERPL-MCNC: 4.3 G/DL (ref 3.8–4.8)
ALBUMIN/GLOB SERPL: 2 {RATIO} (ref 1.2–2.2)
ALP SERPL-CCNC: 77 IU/L (ref 39–117)
ALT SERPL-CCNC: 31 IU/L (ref 0–32)
AST SERPL-CCNC: 23 IU/L (ref 0–40)
BILIRUB SERPL-MCNC: 0.8 MG/DL (ref 0–1.2)
BUN SERPL-MCNC: 14 MG/DL (ref 6–24)
BUN/CREAT SERPL: 18 (ref 9–23)
CALCIUM SERPL-MCNC: 9.1 MG/DL (ref 8.7–10.2)
CHLORIDE SERPL-SCNC: 102 MMOL/L (ref 96–106)
CHOLEST SERPL-MCNC: 200 MG/DL (ref 100–199)
CO2 SERPL-SCNC: 24 MMOL/L (ref 20–29)
CREAT SERPL-MCNC: 0.77 MG/DL (ref 0.57–1)
ERYTHROCYTE [DISTWIDTH] IN BLOOD BY AUTOMATED COUNT: 12.4 % (ref 11.7–15.4)
GLOBULIN SER-MCNC: 2.2 G/DL (ref 1.5–4.5)
GLUCOSE SERPL-MCNC: 94 MG/DL (ref 65–99)
HCT VFR BLD AUTO: 39.4 % (ref 34–46.6)
HDLC SERPL-MCNC: 53 MG/DL
HGB BLD-MCNC: 12.7 G/DL (ref 11.1–15.9)
LDLC SERPL CALC-MCNC: 128 MG/DL (ref 0–99)
LDLC/HDLC SERPL: 2.4 RATIO (ref 0–3.2)
MCH RBC QN AUTO: 29.3 PG (ref 26.6–33)
MCHC RBC AUTO-ENTMCNC: 32.2 G/DL (ref 31.5–35.7)
MCV RBC AUTO: 91 FL (ref 79–97)
MICRODELETION SYND BLD/T FISH: NORMAL
PLATELET # BLD AUTO: 251 X10E3/UL (ref 150–450)
POTASSIUM SERPL-SCNC: 4.6 MMOL/L (ref 3.5–5.2)
PROT SERPL-MCNC: 6.5 G/DL (ref 6–8.5)
RBC # BLD AUTO: 4.33 X10E6/UL (ref 3.77–5.28)
SL AMB EGFR AFRICAN AMERICAN: 106 ML/MIN/1.73
SL AMB EGFR NON AFRICAN AMERICAN: 92 ML/MIN/1.73
SL AMB VLDL CHOLESTEROL CALC: 19 MG/DL (ref 5–40)
SODIUM SERPL-SCNC: 139 MMOL/L (ref 134–144)
TRIGL SERPL-MCNC: 96 MG/DL (ref 0–149)
TSH SERPL DL<=0.005 MIU/L-ACNC: 1.15 UIU/ML (ref 0.45–4.5)
WBC # BLD AUTO: 5.4 X10E3/UL (ref 3.4–10.8)

## 2020-05-27 ENCOUNTER — TELEPHONE (OUTPATIENT)
Dept: FAMILY MEDICINE CLINIC | Facility: CLINIC | Age: 45
End: 2020-05-27

## 2020-06-01 ENCOUNTER — TELEPHONE (OUTPATIENT)
Dept: OBGYN CLINIC | Facility: CLINIC | Age: 45
End: 2020-06-01

## 2020-06-03 ENCOUNTER — OFFICE VISIT (OUTPATIENT)
Dept: OBGYN CLINIC | Facility: CLINIC | Age: 45
End: 2020-06-03
Payer: COMMERCIAL

## 2020-06-03 VITALS
DIASTOLIC BLOOD PRESSURE: 68 MMHG | TEMPERATURE: 97.8 F | BODY MASS INDEX: 38.22 KG/M2 | HEART RATE: 73 BPM | SYSTOLIC BLOOD PRESSURE: 104 MMHG | HEIGHT: 68 IN | WEIGHT: 252.2 LBS

## 2020-06-03 DIAGNOSIS — M75.52 SUBACROMIAL BURSITIS OF LEFT SHOULDER JOINT: ICD-10-CM

## 2020-06-03 DIAGNOSIS — M75.112 NONTRAUMATIC INCOMPLETE TEAR OF LEFT ROTATOR CUFF: Primary | ICD-10-CM

## 2020-06-03 PROCEDURE — 1036F TOBACCO NON-USER: CPT | Performed by: ORTHOPAEDIC SURGERY

## 2020-06-03 PROCEDURE — 99214 OFFICE O/P EST MOD 30 MIN: CPT | Performed by: ORTHOPAEDIC SURGERY

## 2020-06-03 PROCEDURE — 3008F BODY MASS INDEX DOCD: CPT | Performed by: ORTHOPAEDIC SURGERY

## 2020-06-03 RX ORDER — VALACYCLOVIR HYDROCHLORIDE 1 G/1
TABLET, FILM COATED ORAL
COMMUNITY
Start: 2020-05-19

## 2020-06-16 ENCOUNTER — EVALUATION (OUTPATIENT)
Dept: PHYSICAL THERAPY | Facility: CLINIC | Age: 45
End: 2020-06-16
Payer: COMMERCIAL

## 2020-06-16 DIAGNOSIS — M75.112 NONTRAUMATIC INCOMPLETE TEAR OF LEFT ROTATOR CUFF: ICD-10-CM

## 2020-06-16 DIAGNOSIS — M75.52 SUBACROMIAL BURSITIS OF LEFT SHOULDER JOINT: ICD-10-CM

## 2020-06-16 PROCEDURE — 97161 PT EVAL LOW COMPLEX 20 MIN: CPT

## 2024-08-10 ENCOUNTER — HOSPITAL ENCOUNTER (EMERGENCY)
Facility: HOSPITAL | Age: 49
Discharge: HOME | End: 2024-08-10
Attending: EMERGENCY MEDICINE
Payer: COMMERCIAL

## 2024-08-10 VITALS
WEIGHT: 246.4 LBS | DIASTOLIC BLOOD PRESSURE: 59 MMHG | HEART RATE: 90 BPM | OXYGEN SATURATION: 97 % | SYSTOLIC BLOOD PRESSURE: 110 MMHG | BODY MASS INDEX: 38.02 KG/M2 | RESPIRATION RATE: 18 BRPM | TEMPERATURE: 98.8 F

## 2024-08-10 DIAGNOSIS — M25.462 PAIN AND SWELLING OF LEFT KNEE: Primary | ICD-10-CM

## 2024-08-10 DIAGNOSIS — M25.562 PAIN AND SWELLING OF LEFT KNEE: Primary | ICD-10-CM

## 2024-08-10 PROCEDURE — 99282 EMERGENCY DEPT VISIT SF MDM: CPT

## 2024-08-10 PROCEDURE — 99284 EMERGENCY DEPT VISIT MOD MDM: CPT | Performed by: EMERGENCY MEDICINE

## 2024-08-10 RX ORDER — NAPROXEN 500 MG/1
500 TABLET ORAL ONCE
Status: COMPLETED | OUTPATIENT
Start: 2024-08-10 | End: 2024-08-10

## 2024-08-10 RX ADMIN — NAPROXEN 500 MG: 500 TABLET ORAL at 15:29

## 2024-08-10 NOTE — DISCHARGE INSTRUCTIONS
We recommend using the provided Ace wrap to put some pressure on your knee and give some extra support.  Use Naprosyn as needed for pain.  If you have any severe worsening of pain, are unable to walk, had fevers, chills, significant redness, warmth of the joint, return to the emergency department.  Follow-up with orthopedic surgery in the next 2 weeks for reassessment.

## 2024-08-10 NOTE — ED PROVIDER NOTES
History  Chief Complaint   Patient presents with    Knee Swelling     States has had a issue in the past with a left knee effusion. States started 2 weeks ago with swelling and pain has done everything she was instructed in past but no improvement. Patient has bruising posterior medial aspect that she cannot account for.      HPI  Patient is a 49-year-old female presenting for evaluation of left knee pain for about the past week.  Patient states she has been having some component of pain and swelling intermittently for the last year and a half but states that this episode is a bit worse.  Patient states that her granddaughter was visiting and she was carrying her over the course of the past week, denies any additional change in activity or trauma to the area.  Patient denies redness, warmth, fevers, chills, states that pain at rest is minimal but has significant pain with straightening the knee and some mild pain with ambulation.  Patient states that she has been taking Tylenol and Motrin and icing the knee at home with some mild relief of symptoms.  Prior to Admission Medications   Prescriptions Last Dose Informant Patient Reported? Taking?   ARIPiprazole (ABILIFY) 10 mg tablet   Yes No   Sig: Take 1 tablet by mouth daily   FLUoxetine (PROzac) 10 mg capsule   Yes No   Sig: Take 1 capsule by mouth daily   acyclovir (ZOVIRAX) 5 % ointment   No No   Sig: Apply to lips and around mouth 5 times per day for 4 days initiate as soon as possible after first signs and symptoms   clindamycin-benzoyl peroxide (BENZACLIN) gel   No No   Sig: Apply topically 2 (two) times a day   traZODone (DESYREL) 50 mg tablet   Yes No   Sig: Take 1 tablet by mouth daily as needed   valACYclovir (VALTREX) 1,000 mg tablet   Yes No      Facility-Administered Medications: None       Past Medical History:   Diagnosis Date    Abnormal Pap smear of cervix 2015    Blepharitis     Breast lump     last assessed: 3/18/2014    Depression 2011    EBV  seropositivity     last assessed: 3/18/2016    Herpes simplex infection     last assessed: 2014    Hirsutism     HPV (human papilloma virus) infection 2015    Malaise and fatigue     last assessed: 3/18/2016    Multiple joint pain     last assessed: 3/1/2016    Myalgia     last assessed: 3/1/2016    Trauma     Since childhood       Past Surgical History:   Procedure Laterality Date    BREAST BIOPSY      Benign    BREAST FIBROADENOMA SURGERY      COLONOSCOPY  2004    COLPOSCOPY   ??    Hemorrhoids    HYSTEROSCOPY W/ ENDOMETRIAL ABLATION      TOOTH EXTRACTION      TUBAL LIGATION         Family History   Problem Relation Age of Onset    Breast cancer Maternal Grandmother     Arthritis Maternal Grandmother     Cerebral aneurysm Mother     Brain cancer Family      I have reviewed and agree with the history as documented.    E-Cigarette/Vaping    E-Cigarette Use Never User      E-Cigarette/Vaping Substances     Social History     Tobacco Use    Smoking status: Former     Current packs/day: 0.00     Average packs/day: 1 pack/day for 27.6 years (27.6 ttl pk-yrs)     Types: Cigarettes     Start date: 10/31/1991     Quit date: 2019     Years since quittin.1    Smokeless tobacco: Never   Vaping Use    Vaping status: Never Used   Substance Use Topics    Alcohol use: Yes     Alcohol/week: 7.0 standard drinks of alcohol     Types: 7 Glasses of wine per week    Drug use: Never       Review of Systems   Constitutional:  Negative for chills, fatigue and fever.   Musculoskeletal:  Positive for arthralgias (Left knee). Negative for myalgias.   Skin:  Negative for color change, pallor, rash and wound.   Neurological:  Negative for weakness and numbness.   Psychiatric/Behavioral:  Negative for confusion.    All other systems reviewed and are negative.      Physical Exam  Physical Exam  Vitals and nursing note reviewed.   Constitutional:       General: She is not in acute distress.     Appearance: Normal appearance.  She is not ill-appearing, toxic-appearing or diaphoretic.      Comments: Well-appearing, nontoxic, nondistressed   HENT:      Head: Normocephalic and atraumatic.      Comments: Moist mucous membranes     Right Ear: External ear normal.      Left Ear: External ear normal.   Eyes:      General:         Right eye: No discharge.         Left eye: No discharge.   Cardiovascular:      Comments: Regular rate and rhythm, no murmurs rubs or gallops.  Extremities warm and well-perfused without mottling  Pulmonary:      Effort: No respiratory distress.      Comments: No increased work of breathing.  Speaking in complete sentences.  Lungs clear to auscultation bilaterally without wheezes, rales, rhonchi.  Satting 97% on room air indicating adequate oxygenation  Abdominal:      General: There is no distension.   Musculoskeletal:         General: No deformity.      Cervical back: Normal range of motion.      Comments: Mild swelling of the left knee most prominent on the medial aspect.  No surrounding erythema or warmth.  Full range of motion.  No joint laxity.  I do palpate some joint crepitus with extension.  Full sensation throughout the left lower extremity.  2+ DP and PT pulse.   Skin:     Findings: No lesion or rash.   Neurological:      Mental Status: She is alert and oriented to person, place, and time. Mental status is at baseline.      Comments: Awake, alert, pleasant, interactive   Psychiatric:         Mood and Affect: Mood and affect normal.         Vital Signs  ED Triage Vitals [08/10/24 1435]   Temperature Pulse Respirations Blood Pressure SpO2   98.8 °F (37.1 °C) 90 18 110/59 97 %      Temp Source Heart Rate Source Patient Position - Orthostatic VS BP Location FiO2 (%)   Tympanic Monitor Sitting Left arm --      Pain Score       4           Vitals:    08/10/24 1435   BP: 110/59   Pulse: 90   Patient Position - Orthostatic VS: Sitting         Visual Acuity      ED Medications  Medications   naproxen (NAPROSYN) tablet  500 mg (500 mg Oral Given 8/10/24 1529)       Diagnostic Studies  Results Reviewed       None                   No orders to display              Procedures  Procedures         ED Course                                 SBIRT 20yo+      Flowsheet Row Most Recent Value   Initial Alcohol Screen: US AUDIT-C     1. How often do you have a drink containing alcohol? 1 Filed at: 08/10/2024 1437   2. How many drinks containing alcohol do you have on a typical day you are drinking?  1 Filed at: 08/10/2024 1437   3b. FEMALE Any Age, or MALE 65+: How often do you have 4 or more drinks on one occassion? 0 Filed at: 08/10/2024 1437   Audit-C Score 2 Filed at: 08/10/2024 1437   LELA: How many times in the past year have you...    Used an illegal drug or used a prescription medication for non-medical reasons? Never Filed at: 08/10/2024 1437                      Medical Decision Making  I obtained history from the patient.  I reviewed external medical documentation which was noncontributory.    Given patient's description of recent change in activity, chronic component to symptoms, crepitus on exam most likely represents a flare of underlying osteoarthritis.  Patient with no symptoms to suggest infectious etiology.  Left lower extremity neurovascularly intact and no change in joint laxity on exam.  Treated with Naprosyn, Ace bandage, instructed to follow-up not emergently with orthopedics, discharged with return precautions.    Risk  Prescription drug management.                 Disposition  Final diagnoses:   Pain and swelling of left knee     Time reflects when diagnosis was documented in both MDM as applicable and the Disposition within this note       Time User Action Codes Description Comment    8/10/2024  3:23 PM Dominic Lopez Add [M25.562,  M25.462] Pain and swelling of left knee           ED Disposition       ED Disposition   Discharge    Condition   Stable    Date/Time   Sat Aug 10, 2024 1523    Comment   Deepa  Liam discharge to home/self care.                   Follow-up Information       Follow up With Specialties Details Why Contact Info Additional Information    [facility] Emergency Medicine  If symptoms worsen [address] [facility] [address] [phone] [facility], [address] [place], [place], [address]    [facility]   [address]  [place] [address] [phone] [facility], [address], [place], [place], [address] [phone]            Patient's Medications   Discharge Prescriptions    No medications on file       No discharge procedures on file.    PDMP Review       None            ED Provider  Electronically Signed by             Dominic Lopez MD  08/10/24 8216

## 2024-08-15 ENCOUNTER — CLINICAL SUPPORT (OUTPATIENT)
Dept: BARIATRICS | Facility: CLINIC | Age: 49
End: 2024-08-15
Payer: COMMERCIAL

## 2024-08-15 ENCOUNTER — OFFICE VISIT (OUTPATIENT)
Dept: OBGYN CLINIC | Facility: CLINIC | Age: 49
End: 2024-08-15
Payer: COMMERCIAL

## 2024-08-15 ENCOUNTER — APPOINTMENT (OUTPATIENT)
Dept: RADIOLOGY | Facility: CLINIC | Age: 49
End: 2024-08-15
Payer: COMMERCIAL

## 2024-08-15 ENCOUNTER — TELEPHONE (OUTPATIENT)
Age: 49
End: 2024-08-15

## 2024-08-15 VITALS — WEIGHT: 247 LBS | HEIGHT: 68 IN | BODY MASS INDEX: 37.44 KG/M2

## 2024-08-15 VITALS
HEART RATE: 67 BPM | BODY MASS INDEX: 37.65 KG/M2 | SYSTOLIC BLOOD PRESSURE: 112 MMHG | DIASTOLIC BLOOD PRESSURE: 64 MMHG | HEIGHT: 68 IN | WEIGHT: 248.4 LBS

## 2024-08-15 DIAGNOSIS — R63.5 ABNORMAL WEIGHT GAIN: Primary | ICD-10-CM

## 2024-08-15 DIAGNOSIS — M17.12 PRIMARY OSTEOARTHRITIS OF LEFT KNEE: Primary | ICD-10-CM

## 2024-08-15 DIAGNOSIS — G89.29 CHRONIC PAIN OF LEFT KNEE: ICD-10-CM

## 2024-08-15 DIAGNOSIS — M25.562 CHRONIC PAIN OF LEFT KNEE: ICD-10-CM

## 2024-08-15 DIAGNOSIS — M25.562 LEFT KNEE PAIN, UNSPECIFIED CHRONICITY: ICD-10-CM

## 2024-08-15 PROCEDURE — RECHECK

## 2024-08-15 PROCEDURE — 99204 OFFICE O/P NEW MOD 45 MIN: CPT | Performed by: ORTHOPAEDIC SURGERY

## 2024-08-15 PROCEDURE — 73562 X-RAY EXAM OF KNEE 3: CPT

## 2024-08-15 PROCEDURE — 73560 X-RAY EXAM OF KNEE 1 OR 2: CPT

## 2024-08-15 PROCEDURE — WMDI30

## 2024-08-15 RX ORDER — CYCLOBENZAPRINE HCL 10 MG
10 TABLET ORAL 3 TIMES DAILY
COMMUNITY
Start: 2024-07-06

## 2024-08-15 RX ORDER — BUSPIRONE HYDROCHLORIDE 5 MG/1
5 TABLET ORAL 3 TIMES DAILY
COMMUNITY
Start: 2024-08-08

## 2024-08-15 RX ORDER — MELOXICAM 7.5 MG/1
7.5 TABLET ORAL DAILY
Qty: 30 TABLET | Refills: 0 | Status: SHIPPED | OUTPATIENT
Start: 2024-08-15

## 2024-08-15 NOTE — PROGRESS NOTES
"Weight Management Medical Nutrition Assessment  Pt is here today for menu planning. Current weight 247#.  Average weight was 170-180#, in the past 10 years started increasing, but most of the gain has come in the past year. Had recent blood work that was reviewed in office:  elevated LDL, A1c 5.6, TSH WNL.  Pt interested in weight loss due to her knee pain and wants to get around better. Reviewed diet recall and pt appears to consume larger portions and grazes in the afternoon. Did some menu planning with pt to better meet 3654-2059 calories per day for weight loss.  Also encouraged 85-95gm protein per day.  Provided with with other sample menus, snack list and list of lean protein snacks. Pt stated she feels the meal plan is doable. Will f/u in one month to assess progress.     Patient seen by Medical Provider in past 6 months:  no  Requested to schedule appointment with Medical Provider: Not at this time    Anthropometric Measurements  Provider Start Weight (#): n/a#   Current Weight (#): 247#  TBW % Change from start weight: -%  IBW: 137.5# (67.5\")    Weight Loss History  Previous weight loss attempts: Exercise  Self Created Diets (Portion Control, Healthy Food Choices, etc.)    Food and Nutrition Related History  Wake up: 5-6am   Bed Time: 9-11pm     at a group home.  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  Tires to do 8am-4pm    Dietary Recall  Breakfast: coffee first, showers, then breakfast--oatmeal + yogurt (oikos greek) + 2 eggs    Snack: varies  Lunch: left overs from the day before (chicken different ways), was doing salads for a while, but not lately OR turkey and cheese rolled and cut in a salad with lettuce, cucumber, balsamic vinaigrette light OR yesterday had a chipolte bowl steak, chicken, rice, black beans, fajita veggies, corn salad, cheese, sayda, guac  Snack: fruit OR Fiber One bars or chips (2 small bags) while at work   Dinner: may skip if " had lunch late OR chicken (rotisserie chicken) chicken salad or steak   Snack: has a ice cream in the freezer so has been eating off of that lately, but not usually    Beverages: 1 cup of coffee, water  Volume of beverage intake: 20oz coffee with sweet Italian creamer (probably about 2 tbsp to 1/4 cup), 32-64oz water per day    Weekends: no structure, sleeps later, tries not to skip meals  Cravings: both sweet and salty  Trouble area of day: varies    Frequency of Eating out: not too often  Food restrictions: none  Lives with her sister  Cooking: she does her own   Food Shopping: she does her own    Physical Activity  Activity: was walking but in the past month has stopped due to the weather and her knees hurt. Does have a peddler at home that she may start doing.  Frequency: none  Physical limitations/barriers to exercise: knee pain    Estimated Needs  [employer] Needs (needs at 247#)  BMR: 1776 kcal  Maintenance calories (sedentary): 2131 kcal  1-2#/week loss (sedentary): 0036-1066 kcal  1-2#/week loss (light activity): 8420-7368 kcal    Protein: 75-95 grams (1.2-1.5g/kg IBW)  Fluid: 2205ml (35mL/kg IBW)    Nutrition Diagnosis  Yes;    Overweight/obesity  related to Excess energy intake as evidenced by  BMI more than normative standard for age and sex (obesity-grade II 35-39.9)       Nutrition Intervention    Nutrition Prescription  Calories: 2548-5846 kcal  Protein: 85-95 g  Fluid: 2200 ml    Meal Plan (Edilberto/Pro)  Breakfast: 300  Snack: 150  Lunch: 300-400  Snack: 150  Dinner: 400  Snack: --    Nutrition Education  Calorie controlled menu  Lean protein food choices  Healthy snack options  Food journaling tips    Nutrition Counseling  Strategies: meal planning, portion sizes, healthy snack choices, hydration, fiber intake, protein intake, exercise, food logging    Monitoring and Evaluation:    Evaluation criteria  Energy Intake  Meet protein needs  Maintain adequate hydration  Monitor weekly  weight  Meal planning/preparation  Food journal   Decreased portions at mealtimes and snacks  Physical activity     Barriers to change:none  Readiness to change: Preparation:  (Getting ready to change)   Comprehension: good  Expected Compliance: good

## 2024-08-15 NOTE — PROGRESS NOTES
Assessment/Plan:  1. Primary osteoarthritis of left knee  Ambulatory Referral to Physical Therapy    meloxicam (Mobic) 7.5 mg tablet      2. Chronic pain of left knee  XR knee 3 vw left non injury    XR knee 1 or 2 vw right    meloxicam (Mobic) 7.5 mg tablet        Scribe Attestation      I,:  Aleyda Rizo MA am acting as a scribe while in the presence of the attending physician.:       I,:  Farzad Weston DO personally performed the services described in this documentation    as scribed in my presence.:           49-year-old female who presents to the office today for evaluation of left knee pain. After thorough history, clinical exam, and reviewing her imaging I discussed with her that her signs and symptoms are consistent with left knee osteoarthritis. Treatment options were discussed in the form of formal therapy and a daily anti-inflammatory. The patient was agreeable to this. A referral was provided for formal therapy and a prescription was provided for Meloxicam. We discussed possible steroid injection if needed in the future. She will follow up in 8-10 weeks for repeat evaluation. All of her questions were addressed in the office today and she may call the office at any time with any questions or concerns.     Subjective: left knee pain    Patient ID: Deepa Wheeler is a 49 y.o. female who presents to the office today for evaluation of left knee pain. The patient notes pain and swelling that has been ongoing for a few weeks. She denies any injury or trauma. She notes swelling and tightness. She also notes intermittent pain to the medial and anterior aspect of her knee. She notes increased pain with prolonged sitting. She describes the pain as deep. She has been using ice and Ibuprofen as needed for pain. The patient states in March of 2023 she was evaluated at [facility] and underwent a course of formal therapy. She denies prior surgery.     Review of Systems   Constitutional:  Positive for activity  change. Negative for chills and fever.   HENT:  Negative for drooling and sneezing.    Eyes:  Negative for redness.   Respiratory:  Negative for cough and wheezing.    Gastrointestinal:  Negative for nausea and vomiting.   Musculoskeletal:  Positive for arthralgias and joint swelling. Negative for myalgias.   Neurological:  Negative for weakness and numbness.   Psychiatric/Behavioral:  Negative for behavioral problems. The patient is not nervous/anxious.          Past Medical History:   Diagnosis Date    Abnormal Pap smear of cervix     Blepharitis     Breast lump     last assessed: 3/18/2014    Depression     EBV seropositivity     last assessed: 3/18/2016    Herpes simplex infection     last assessed: 2014    Hirsutism     HPV (human papilloma virus) infection     Malaise and fatigue     last assessed: 3/18/2016    Multiple joint pain     last assessed: 3/1/2016    Myalgia     last assessed: 3/1/2016    Trauma     Since childhood       Past Surgical History:   Procedure Laterality Date    BREAST BIOPSY      Benign    BREAST FIBROADENOMA SURGERY      COLONOSCOPY      COLPOSCOPY   ??    Hemorrhoids    HYSTEROSCOPY W/ ENDOMETRIAL ABLATION      TOOTH EXTRACTION      TUBAL LIGATION         Family History   Problem Relation Age of Onset    Breast cancer Maternal Grandmother     Arthritis Maternal Grandmother     Cerebral aneurysm Mother     Brain cancer Family        Social History     Occupational History    Not on file   Tobacco Use    Smoking status: Former     Current packs/day: 0.00     Average packs/day: 1 pack/day for 27.6 years (27.6 ttl pk-yrs)     Types: Cigarettes     Start date: 10/31/1991     Quit date: 2019     Years since quittin.1    Smokeless tobacco: Never   Vaping Use    Vaping status: Never Used   Substance and Sexual Activity    Alcohol use: Yes     Alcohol/week: 7.0 standard drinks of alcohol     Types: 7 Glasses of wine per week    Drug use: Never    Sexual  activity: Not Currently     Partners: Male     Birth control/protection: None         Current Outpatient Medications:     acyclovir (ZOVIRAX) 5 % ointment, Apply to lips and around mouth 5 times per day for 4 days initiate as soon as possible after first signs and symptoms, Disp: 30 g, Rfl: 0    ARIPiprazole (ABILIFY) 10 mg tablet, Take 1 tablet by mouth daily, Disp: , Rfl:     busPIRone (BUSPAR) 5 mg tablet, Take 5 mg by mouth 3 (three) times a day, Disp: , Rfl:     clindamycin-benzoyl peroxide (BENZACLIN) gel, Apply topically 2 (two) times a day, Disp: 50 g, Rfl: 1    cyclobenzaprine (FLEXERIL) 10 mg tablet, Take 10 mg by mouth 3 (three) times a day, Disp: , Rfl:     FLUoxetine (PROzac) 10 mg capsule, Take 1 capsule by mouth daily, Disp: , Rfl:     meloxicam (Mobic) 7.5 mg tablet, Take 1 tablet (7.5 mg total) by mouth daily, Disp: 30 tablet, Rfl: 0    traZODone (DESYREL) 50 mg tablet, Take 1 tablet by mouth daily as needed, Disp: , Rfl:     valACYclovir (VALTREX) 1,000 mg tablet, , Disp: , Rfl:     Allergies   Allergen Reactions    Hydrocodone-Acetaminophen Abdominal Pain    Penicillins Itching       Objective:  Vitals:    08/15/24 0907   BP: 112/64   Pulse: 67       Body mass index is 38.33 kg/m².    Left Knee Exam     Range of Motion   Extension:  0   Flexion:  130     Tests   Yuridia:  Medial - negative Lateral - negative  Varus: negative Valgus: negative    Other   Erythema: absent  Sensation: normal  Pulse: present  Effusion: no effusion present    Comments:  Knee is stable at 0, 30, and 90  + peripatellar crepitus   Palpable baker's cyst  - apley  No erythema or warmth           Observations   Left Knee   Negative for effusion.       Physical Exam  Vitals and nursing note reviewed.   Constitutional:       Appearance: Normal appearance. She is well-developed.   HENT:      Head: Normocephalic and atraumatic.      Nose: Nose normal.   Eyes:      General:         Right eye: No discharge.         Left eye: No  discharge.      Extraocular Movements: Extraocular movements intact.      Conjunctiva/sclera: Conjunctivae normal.   Cardiovascular:      Rate and Rhythm: Normal rate.   Pulmonary:      Effort: Pulmonary effort is normal. No respiratory distress.   Musculoskeletal:      Cervical back: Normal range of motion and neck supple.      Left knee: No effusion.      Instability Tests: Medial Yuridia test negative and lateral Yuridia test negative.      Comments: As noted in HPI   Skin:     General: Skin is warm and dry.   Neurological:      Mental Status: She is alert and oriented to person, place, and time.   Psychiatric:         Behavior: Behavior normal.         Thought Content: Thought content normal.         Judgment: Judgment normal.         I have personally reviewed pertinent films in PACS. X-rays left knee performed in the office today demonstrates mild left knee osteoarthritis.     This document was created using speech voice recognition software.   Grammatical errors, random word insertions, pronoun errors, and incomplete sentences are an occasional consequence of this system due to software limitations, ambient noise, and hardware issues.   Any formal questions or concerns about content, text, or information contained within the body of this dictation should be directly addressed to the provider for clarification.

## 2024-08-22 ENCOUNTER — EVALUATION (OUTPATIENT)
Dept: PHYSICAL THERAPY | Facility: CLINIC | Age: 49
End: 2024-08-22
Payer: COMMERCIAL

## 2024-08-22 DIAGNOSIS — M17.12 PRIMARY OSTEOARTHRITIS OF LEFT KNEE: Primary | ICD-10-CM

## 2024-08-22 PROCEDURE — 97161 PT EVAL LOW COMPLEX 20 MIN: CPT

## 2024-08-22 NOTE — PROGRESS NOTES
Daily Note     Today's date: 2024  Patient name: Deepa Wheeler  : 1975  MRN: [id number]  Referring provider: Farzad Weston DO  Dx:   Encounter Diagnosis     ICD-10-CM    1. Primary osteoarthritis of left knee  M17.12                      Subjective: Pt reports that she felt good after eval. Reports that she went to beach late last week, had some discomfort on second day. Not sure if it was prolonged walking, uneven surface, or all the things she was carrying.       Objective: requires cueing w/ retro walking to promote quad activation initially, corrects and is able to maintain.       Assessment: Tolerated treatment well. Patient demonstrated fatigue post treatment and would benefit from continued PT. Does well w/ exercise progressions. Fatigue but no increase in pain by end of session. Will benefit from progressive increases in resistance barring setback. Discussed self pay vs insurance, pt will call insurance and let us know of decision at next visit. Also discussed [facility]  gym, pt interested in joining. Will look to increase intensity at next visit barring setback.      Plan: Continue per plan of care. Step up, slow ecc down       POC expires Auth Status Total   Visits  Start date  Expiration date PT/OT + Visit Limit? Co-Insurance    Review insurance again! Ask about self pay!     No                                             Visit/Unit Tracking  AUTH Status:  Date               Visits  Authed: Used                Remaining                    Date (Visit #)  IE (1)  (2) (3) (4) (5)   Manual        DTM and TPR                                Exercise Diary         Ther Ex        Active w/u   rec pre 6-7 mins lvl3-4         PROM X3-4 mins L knee  x4 mins CP w/ intermittent LAD         HS/glute/piri stretch  tested B   3x30 sec on L         QS, SLR, hip abd  QS x10 B, rev of SLR QS x10, SLR cw/ccw 2x10, s/l hip abd cw/ccw 2x10         Active mobility        Leg press  Iso 95# 10 sec hold  x10 each (45-60 deg)                                      Neuro Re Ed        Bridging  rev  rev         TrA progressions             Squat training  tested  reg x15 total         Hip Abd Progressions             Sled drag  50# retro 50'x6      HE ant taps Level ground x10 B 2x10  SERENA SS 2x10 HE      TKE Black x10 on L X15 total       HEP and POC review  X5 mins X3-4 mins                                      Ther Act             stairs             gait             Sit<>stand                                          Modalities              heat               Ice

## 2024-08-22 NOTE — PROGRESS NOTES
PT Evaluation     Today's date: 2024  Patient name: Deepa Wheeler  : 1975  MRN: [id number]  Referring provider: Farzad Weston DO  Dx:   Encounter Diagnosis     ICD-10-CM    1. Primary osteoarthritis of left knee  M17.12 Ambulatory Referral to Physical Therapy          Start Time: 810  Stop Time: 845  Total time in clinic (min): 35 minutes    Assessment  Impairments: abnormal gait, abnormal muscle firing, abnormal muscle tone, abnormal or restricted ROM, abnormal movement, activity intolerance, impaired physical strength, lacks appropriate home exercise program and pain with function  Symptom irritability: low    Assessment details: Pt is a pleasant 49 y.o. female who presents to [facility] PT with L knee pain, chronic in nature w/o specific OSCAR, worse in the last 3-4 months. Today, she presents with weakness, decreased ROM, new onset of impairment of functional mobility, decreased activity tolerance, and decreased strength. Functionally, she is limited in her ability to stand and ambulate, negotiate stairs, bend and lift, perform prolonged WB, and sleep through the night. She is motivated to improve. Pt will benefit from skilled PT to address the aforementioned deficits and limitations in an effort to maximize pain free functional mobility and overall quality of life. Progress as able with these goals in mind.       Understanding of Dx/Px/POC: good     Prognosis: good    Goals  Short term goals (3 weeks):  1) Pt will improve L knee AROM to 0-125 pain free.  2) Pt will improve B LE and core strength deficits by 1/3 grade MMT.   3) Pt will reports pain at worse <5/10.  4) Pt will initiate and progress HEP w/ special emphasis on functional knee ROM and core/hip strength.     Long term goals (6 weeks)  1) Pt will improve FOTO to at least 61.   2) Pt will perform two full weeks of all home activity and community ambulation w/o deficit related to L knee.   3) Pt will be functionally unlimited w/ use of  stairs, step over step w/o UE support.  4) Pt will be independent and compliant w/ HEP in order to maximize functional benefit of skilled PT following d/c.       Plan  Patient would benefit from: skilled PT  Planned modality interventions: cryotherapy and thermotherapy: hydrocollator packs    Planned therapy interventions: abdominal trunk stabilization, activity modification, joint mobilization, manual therapy, massage, motor coordination training, neuromuscular re-education, patient education, postural training, stretching, strengthening, therapeutic activities, therapeutic exercise, therapeutic training, transfer training, home exercise program, gait training, graded activity, functional ROM exercises, graded exercise, flexibility, body mechanics training, balance and balance/weight bearing training    Frequency: 2x week  Duration in weeks: 6  Treatment plan discussed with: patient  Plan details: HEP to start: HE ant taps, table exercise review, TKE and TKE w/ mini squat          Subjective Evaluation    History of Present Illness  Mechanism of injury: Pt notes that L knee pain started in March 2023. Was told she had knee effusion. Went to PT, did help. Was d/c, continued w/ exercises for a while especially when pain would flair up. Notes that pain is not stopping recently, even w/ continued exercise. Notes that end of July her daughter and grand daughter were here. She was carrying 1 year old grand child around, pain started to build. Going onto floor, carrying her was difficult. Little better since they left on 8/5. Was given meloxicam by Dr. Weston. Was given ace bandage, likes sleeve better. Notes that she did injure L knee many years ago as a child. Main goal is pain relief. Functional status below:  Quality of life: good    Patient Goals  Patient goals for therapy: increased strength, decreased pain, increased motion and return to sport/leisure activities  Patient goal: be able to move L knee without pain!  be able to extend knee!  Pain  Current pain ratin  At best pain ratin  At worst pain rating: 10  Location: medial TF joint line, deep within L knee (points to patellar tendon, deep)  Quality: tight and sharp  Relieving factors: medications, rest, change in position and ice (meloxicam (prescribed), Tylenol, sleeps w/ leg elevated)  Aggravating factors: standing, walking, stair climbing, lifting and running (takes some time to loosen in the morning, gets worse at end of day)  Progression: improved    Social Support  Steps to enter house: no  Stairs in house: no   Lives in: apartment  Lives with: sister.    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)        Objective     Palpation     Additional Palpation Details  TTP along medial TF joint line (L)    Neurological Testing     Sensation     Knee   Left Knee   Intact: Light touch    Right Knee   Intact: light touch     Active Range of Motion   Left Knee   Flexion: 115 degrees with pain  Extension: 6 degrees with pain    Right Knee   Normal active range of motion    Passive Range of Motion   Left Knee   Flexion: 124 degrees with pain  Extension: 3 degrees with pain    Right Knee   Normal passive range of motion    Strength/Myotome Testing     Left Knee   Flexion: 4  Extension: 4    Right Knee   Flexion: 4+  Extension: 4+    Additional Strength Details  LE Strength (R/L)    Hip  Grossly 4/5 throughout    Core Strength: 1+/5     *Indicates pain      Tests     Additional Tests Details  Negative for any structural instability     Ambulation     Ambulation: Level Surfaces     Additional Level Surfaces Ambulation Details  Min decreased step length and WB on L LE, fails to fully push off on L LE, min decreased TKE throughout midstance phase of WB on L    General Comments:      Knee Comments  Sit<>stand: UE support on LE w/ R side WS    BW squat: not past 50% before R side WS     Stairs: TBA     SLS: painful on  L LE        Flowsheet Rows      Flowsheet Row Most Recent Value   PT/OT G-Codes    Current Score 49   Projected Score 61               POC expires Auth Status Total   Visits  Start date  Expiration date PT/OT + Visit Limit? Co-Insurance    Review insurance again! Ask about self pay!     No                                             Visit/Unit Tracking  AUTH Status:  Date               Visits  Authed: Used                Remaining                    Date (Visit #) 8/22 IE (1) (2) (3) (4) (5)   Manual        DTM and TPR                                Exercise Diary         Ther Ex        Active w/u             PROM X3-4 mins L knee           HS/glute/piri stretch  tested B            QS, SLR, hip abd  QS x10 B, rev of SLR           Active mobility                                                Neuro Re Ed        Bridging  rev           TrA progressions             Squat training  tested           Hip Abd Progressions             Balance         HE ant taps Level ground x10 B       TKE Black x10 on L       HEP and POC review  X5 mins                                       Ther Act             stairs             gait             Sit<>stand                                          Modalities              heat               Ice

## 2024-08-26 ENCOUNTER — OFFICE VISIT (OUTPATIENT)
Dept: PHYSICAL THERAPY | Facility: CLINIC | Age: 49
End: 2024-08-26
Payer: COMMERCIAL

## 2024-08-26 DIAGNOSIS — M17.12 PRIMARY OSTEOARTHRITIS OF LEFT KNEE: Primary | ICD-10-CM

## 2024-08-26 PROCEDURE — 97112 NEUROMUSCULAR REEDUCATION: CPT

## 2024-08-26 PROCEDURE — 97110 THERAPEUTIC EXERCISES: CPT

## 2024-08-28 NOTE — PROGRESS NOTES
Daily Note     Today's date: 2024  Patient name: Deepa Wheeler  : 1975  MRN: [id number]  Referring provider: Farzad Weston DO  Dx:   Encounter Diagnosis     ICD-10-CM    1. Primary osteoarthritis of left knee  M17.12                      Subjective: Pt reports she is feeling a little sore from last session.       Objective:       Assessment: Tolerated treatment well. Patient demonstrated fatigue post treatment and would benefit from continued PT. Does well w/ exercise progressions. Fatigue but no increase in pain by end of session. She will benefit from additional core strengthening exercises. Pt will continue to benefit from skilled physical therapy in order to maximize strength and improve quality of movement.       Plan: Continue per plan of care.      POC expires Auth Status Total   Visits  Start date  Expiration date PT/OT + Visit Limit? Co-Insurance    Review insurance again! Ask about self pay!     No                                             Visit/Unit Tracking  AUTH Status:  Date               Visits  Authed: Used                Remaining                    Date (Visit #)  IE (1)  (2)  (3) (4) (5)   Manual        DTM and TPR                                Exercise Diary         Ther Ex        Active w/u   rec pre 6-7 mins lvl3-4  re bike 8 min lvl 3        PROM X3-4 mins L knee  x4 mins CP w/ intermittent LAD IM PROM and LAD        HS/glute/piri stretch  tested B   3x30 sec on L  30s x 3        QS, SLR, hip abd  QS x10 B, rev of SLR QS x10, SLR cw/ccw 2x10, s/l hip abd cw/ccw 2x10  SLR ABC x 1        Active mobility        Leg press  Iso 95# 10 sec hold x10 each (45-60 deg)                                      Neuro Re Ed        Bridging  rev  rev         TrA progressions      TA iso 5s x 10     TA + fallouts x 10 B     TA + marches x 10 B     TA + bridge x 10        Squat training  tested  reg x15 total         Hip Abd Progressions             Sled drag  50# retro 50'x6      HE  ant taps Level ground x10 B 2x10  SERENA SS 2x10 HE      TKE Black x10 on L X15 total       HEP and POC review  X5 mins X3-4 mins                                      Ther Act             stairs             gait             Sit<>stand                                          Modalities              heat               Ice

## 2024-08-29 ENCOUNTER — OFFICE VISIT (OUTPATIENT)
Dept: PHYSICAL THERAPY | Facility: CLINIC | Age: 49
End: 2024-08-29
Payer: COMMERCIAL

## 2024-08-29 DIAGNOSIS — M17.12 PRIMARY OSTEOARTHRITIS OF LEFT KNEE: Primary | ICD-10-CM

## 2024-08-29 PROCEDURE — 97110 THERAPEUTIC EXERCISES: CPT | Performed by: PHYSICAL THERAPIST

## 2024-08-29 PROCEDURE — 97112 NEUROMUSCULAR REEDUCATION: CPT | Performed by: PHYSICAL THERAPIST

## 2024-09-09 ENCOUNTER — OFFICE VISIT (OUTPATIENT)
Dept: PHYSICAL THERAPY | Facility: CLINIC | Age: 49
End: 2024-09-09
Payer: COMMERCIAL

## 2024-09-09 DIAGNOSIS — M17.12 PRIMARY OSTEOARTHRITIS OF LEFT KNEE: Primary | ICD-10-CM

## 2024-09-09 PROCEDURE — 97110 THERAPEUTIC EXERCISES: CPT

## 2024-09-09 PROCEDURE — 97112 NEUROMUSCULAR REEDUCATION: CPT

## 2024-09-09 NOTE — PROGRESS NOTES
Daily Note     Today's date: 2024  Patient name: Deepa Wheeler  : 1975  MRN: [id number]  Referring provider: Farzad Weston DO  Dx:   Encounter Diagnosis     ICD-10-CM    1. Primary osteoarthritis of left knee  M17.12                      Subjective: Pt reports that L knee feels a little stiff today, relaxed this weekend and didn't have to do too much. Overall, feels she has improved from first day.       Objective: requires cueing for ecc control w/ SERENA SS and step up progressions, good correction but quick fatigue.       Assessment: Tolerated treatment well. Patient demonstrated fatigue post treatment and would benefit from continued PT. Does well w/ exercise progressions today, fatigue evident by end but she does well to control weight through available range. Did not add more techniques today so as not to exacerbate muscle soreness. Will look to increase total workload next visit barring setback.      Plan: Continue per plan of care. Eccentric step downs, sled work, incorporate HS progression     POC expires Auth Status Total   Visits  Start date  Expiration date PT/OT + Visit Limit? Co-Insurance    Review insurance again! Ask about self pay!     No                                             Visit/Unit Tracking  AUTH Status:  Date               Visits  Authed: Used                Remaining                    Date (Visit #)  IE (1)  (2)  (3)  (4) (5)   Manual        DTM and TPR                                Exercise Diary         Ther Ex        Active w/u   rec pre 6-7 mins lvl3-4  re bike 8 min lvl 3   rec pre x 7 mins lvl 3-4     PROM X3-4 mins L knee  x4 mins CP w/ intermittent LAD IM PROM and LAD   CP x 6 mins w/ LAD on L      HS/glute/piri stretch  tested B   3x30 sec on L  30s x 3   3x30 sec B      QS, SLR, hip abd  QS x10 B, rev of SLR QS x10, SLR cw/ccw 2x10, s/l hip abd cw/ccw 2x10  SLR ABC x 1        Active mobility        Leg press  Iso 95# 10 sec hold x10 each (45-60 deg)   "                                    Neuro Re Ed        Bridging  rev  rev    2x10 w/ TrA set    2x10 march     TrA progressions      TA iso 5s x 10     TA + fallouts x 10 B     TA + marches x 10 B     TA + bridge x 10        Squat training  tested  reg x15 total    reg x 3-4    W/ WS med/lat and pause x 5-6 total     Hip Abd Progressions             Sled drag  50# retro 50'x6  NV    HE ant taps Level ground x10 B 2x10  SERENA SS 2x10 HE  2x8 SERENA SS BW, 12.5# B DB x8 B    TKE Black x10 on L X15 total       HEP and POC review  X5 mins X3-4 mins  X2-3 mins        Step up 12\" BW x8 B, 15# uni DB 2x8 B                            Ther Act             stairs             gait             Sit<>stand                                          Modalities              heat               Ice                                                   "

## 2024-09-10 NOTE — PROGRESS NOTES
Daily Note     Today's date: 2024  Patient name: Deepa Wheeler  : 1975  MRN: [id number]  Referring provider: Farzad Weston DO  Dx:   Encounter Diagnosis     ICD-10-CM    1. Primary osteoarthritis of left knee  M17.12                      Subjective: Pt reports that her quads were very sore after last visit. Reports that knee does not hurt. Has some testing at work today that will involve kneeling and is unsure if she will be able to do this.       Objective: requires cueing w/ reg split squat to promote hip drive vs trunk lateral lean or UE support - corrects through half range only.       Assessment: Tolerated treatment well. Patient demonstrated fatigue post treatment and would benefit from continued PT. Does well w/ strength work today. Purposely kept session on lighter side so as not to exacerbate quad soreness. She is motivated by progress, we will gently increase intensity at next visit barring setback.      Plan: Continue per plan of care. Sled progressions, step up progressions, hip abd work     POC expires Auth Status Total   Visits  Start date  Expiration date PT/OT + Visit Limit? Co-Insurance    Review insurance again! Ask about self pay!     No                                             Visit/Unit Tracking  AUTH Status:  Date               Visits  Authed: Used                Remaining                    Date (Visit #)  IE (1)  (2)  (3)  (4)  (5)   Manual        DTM and TPR                                Exercise Diary         Ther Ex        Active w/u   rec pre 6-7 mins lvl3-4  re bike 8 min lvl 3   rec pre x 7 mins lvl 3-4  pre 6-7 mins lvl 3-4   PROM X3-4 mins L knee  x4 mins CP w/ intermittent LAD IM PROM and LAD   CP x 6 mins w/ LAD on L   x5-6 mins B LE w/ HS and quad stretching    HS/glute/piri stretch  tested B   3x30 sec on L  30s x 3   3x30 sec B  3x30 sec B   QS, SLR, hip abd  QS x10 B, rev of SLR QS x10, SLR cw/ccw 2x10, s/l hip abd cw/ccw 2x10  SLR ABC x 1     " SLR circles cw/ccw 2x10 B   Active mobility        Leg press  Iso 95# 10 sec hold x10 each (45-60 deg)                                      Neuro Re Ed        Bridging  rev  rev    2x10 w/ TrA set    2x10 march X20    TrA progressions      TA iso 5s x 10     TA + fallouts x 10 B     TA + marches x 10 B     TA + bridge x 10     rev   Squat training  tested  reg x15 total    reg x 3-4    W/ WS med/lat and pause x 5-6 total  rev   Hip Abd Progressions             Sled drag  50# retro 50'x6  NV    HE ant taps Level ground x10 B 2x10  SERENA SS 2x10 HE  2x8 SERENA SS BW, 12.5# B DB x8 B SERENA SS HE BW only 2x10 B    Reg split squat 2x10    TKE Black x10 on L X15 total    Kneeling discussion and practice x 5 mins   HEP and POC review  X5 mins X3-4 mins  X2-3 mins X2-3 mins       Step up 12\" BW x8 B, 15# uni DB 2x8 B         Sled retro walk 50# 50'x4                   Ther Act             stairs             gait             Sit<>stand                                          Modalities              heat               Ice                                                     "

## 2024-09-11 ENCOUNTER — OFFICE VISIT (OUTPATIENT)
Dept: PHYSICAL THERAPY | Facility: CLINIC | Age: 49
End: 2024-09-11
Payer: COMMERCIAL

## 2024-09-11 DIAGNOSIS — M17.12 PRIMARY OSTEOARTHRITIS OF LEFT KNEE: Primary | ICD-10-CM

## 2024-09-11 PROCEDURE — 97110 THERAPEUTIC EXERCISES: CPT

## 2024-09-11 PROCEDURE — 97112 NEUROMUSCULAR REEDUCATION: CPT

## 2024-09-16 ENCOUNTER — OFFICE VISIT (OUTPATIENT)
Age: 49
End: 2024-09-16
Payer: COMMERCIAL

## 2024-09-16 VITALS
HEIGHT: 68 IN | DIASTOLIC BLOOD PRESSURE: 72 MMHG | WEIGHT: 247 LBS | RESPIRATION RATE: 17 BRPM | SYSTOLIC BLOOD PRESSURE: 109 MMHG | HEART RATE: 67 BPM | BODY MASS INDEX: 37.44 KG/M2

## 2024-09-16 DIAGNOSIS — L03.032 PARONYCHIA OF TOENAIL OF LEFT FOOT: ICD-10-CM

## 2024-09-16 DIAGNOSIS — M79.672 PAIN IN BOTH FEET: ICD-10-CM

## 2024-09-16 DIAGNOSIS — B35.1 ONYCHOMYCOSIS: Primary | ICD-10-CM

## 2024-09-16 DIAGNOSIS — M79.671 PAIN IN BOTH FEET: ICD-10-CM

## 2024-09-16 PROCEDURE — 99202 OFFICE O/P NEW SF 15 MIN: CPT | Performed by: PODIATRIST

## 2024-09-16 RX ORDER — TERBINAFINE HYDROCHLORIDE 250 MG/1
250 TABLET ORAL DAILY
Qty: 30 TABLET | Refills: 0 | Status: SHIPPED | OUTPATIENT
Start: 2024-09-16 | End: 2024-10-16

## 2024-09-16 NOTE — PROGRESS NOTES
Assessment/Plan: Mycosis hallux nail by lateral.  Paronychia left hallux.  Pain.    Plan.  Chart reviewed.  PCP notes reviewed.  Lab work reviewed.  Patient has no elevation of liver function enzymes.  She will therefore be placed on oral terbinafine.  Aftercare instruction given.  Patient will spray shoes with Lysol.  In addition she will take vitamin B 5 as directed.  Return for follow-up         Diagnoses and all orders for this visit:    Onychomycosis  -     terbinafine (LamISIL) 250 mg tablet; Take 1 tablet (250 mg total) by mouth daily    Pain in both feet    Paronychia of toenail of left foot          Subjective: Patient is concerned with her toenails.  She believes she may have fungus of nail.  This is of the big toe.    Allergies   Allergen Reactions    Hydrocodone-Acetaminophen Abdominal Pain    Penicillins Itching         Current Outpatient Medications:     acyclovir (ZOVIRAX) 5 % ointment, Apply to lips and around mouth 5 times per day for 4 days initiate as soon as possible after first signs and symptoms, Disp: 30 g, Rfl: 0    busPIRone (BUSPAR) 5 mg tablet, Take 5 mg by mouth 3 (three) times a day, Disp: , Rfl:     FLUoxetine (PROzac) 10 mg capsule, Take 1 capsule by mouth daily, Disp: , Rfl:     meloxicam (Mobic) 7.5 mg tablet, Take 1 tablet (7.5 mg total) by mouth daily, Disp: 30 tablet, Rfl: 0    terbinafine (LamISIL) 250 mg tablet, Take 1 tablet (250 mg total) by mouth daily, Disp: 30 tablet, Rfl: 0    ARIPiprazole (ABILIFY) 10 mg tablet, Take 1 tablet by mouth daily, Disp: , Rfl:     clindamycin-benzoyl peroxide (BENZACLIN) gel, Apply topically 2 (two) times a day, Disp: 50 g, Rfl: 1    cyclobenzaprine (FLEXERIL) 10 mg tablet, Take 10 mg by mouth 3 (three) times a day, Disp: , Rfl:     traZODone (DESYREL) 50 mg tablet, Take 1 tablet by mouth daily as needed, Disp: , Rfl:     valACYclovir (VALTREX) 1,000 mg tablet, , Disp: , Rfl:     Patient Active Problem List   Diagnosis    Bacterial vaginosis     Low grade squamous intraepithelial lesion (LGSIL) on cervical Pap smear    Cervical high risk HPV (human papillomavirus) test positive    Chronic left shoulder pain    Cystic acne    Depression with anxiety    Hemorrhoid    Hirsutism    Insomnia    Radiculopathy of arm    Rotator cuff tendinitis, left    Vitamin B12 deficiency anemia    Vitamin D insufficiency          Patient ID: Deepa Wheeler is a 49 y.o. female.    HPI    The following portions of the patient's history were reviewed and updated as appropriate:     family history includes Arthritis in her maternal grandmother; Brain cancer in her family; Breast cancer in her maternal grandmother; Cerebral aneurysm in her mother.      reports that she quit smoking about 5 years ago. Her smoking use included cigarettes. She started smoking about 32 years ago. She has a 27.6 pack-year smoking history. She has never used smokeless tobacco. She reports current alcohol use of about 7.0 standard drinks of alcohol per week. She reports that she does not use drugs.    Vitals:    09/16/24 1447   BP: 109/72   Pulse: 67   Resp: 17       Review of Systems      Objective:  Patient's shoes and socks removed.   Foot ExamPhysical Exam  Vitals and nursing note reviewed.   Constitutional:       Appearance: Normal appearance.   Cardiovascular:      Rate and Rhythm: Normal rate and regular rhythm.   Skin:     Capillary Refill: Capillary refill takes less than 2 seconds.      Comments: All nails are dystrophic.  Hallux bilateral has distal mycosis.  Left hallux has paronychia fibular nail groove.   Neurological:      Mental Status: She is alert.   Psychiatric:         Mood and Affect: Mood normal.         Behavior: Behavior normal.         Thought Content: Thought content normal.         Judgment: Judgment normal.

## 2024-09-17 ENCOUNTER — OFFICE VISIT (OUTPATIENT)
Dept: PHYSICAL THERAPY | Facility: CLINIC | Age: 49
End: 2024-09-17
Payer: COMMERCIAL

## 2024-09-17 DIAGNOSIS — M17.12 PRIMARY OSTEOARTHRITIS OF LEFT KNEE: Primary | ICD-10-CM

## 2024-09-17 PROCEDURE — 97110 THERAPEUTIC EXERCISES: CPT

## 2024-09-17 PROCEDURE — 97112 NEUROMUSCULAR REEDUCATION: CPT

## 2024-09-17 NOTE — PROGRESS NOTES
Daily Note     Today's date: 2024  Patient name: Deepa Wheeler  : 1975  MRN: [id number]  Referring provider: Farzad Weston DO  Dx:   Encounter Diagnosis     ICD-10-CM    1. Primary osteoarthritis of left knee  M17.12             Subjective: Pt reports 0/10 pain today.       Objective: see treatment diary below      Assessment: Tolerated treatment well. Patient required intermittent use of UE assist on wall for new exercises such as RDL's but was able to complete independently with good form using foam roller by end of session. Patient also demonstrated knees over toes without pain during Yi split squat today with good depth, but unable to achieve parallel just yet. Overall patient seems to be progressing very well at this time. Patient demonstrated fatigue post treatment and would benefit from continued PT to continue with return to function.      Plan: Continue per plan of care.      POC expires Auth Status Total   Visits  Start date  Expiration date PT/OT + Visit Limit? Co-Insurance    Review insurance again! Ask about self pay!     No                                             Visit/Unit Tracking  AUTH Status:  Date               Visits  Authed: Used                Remaining                    Date (Visit #)  (3)  (4)  (5)  (6)    Manual        DTM and TPR                                Exercise Diary         Ther Ex        Active w/u  re bike 8 min lvl 3   rec pre x 7 mins lvl 3-4  pre 6-7 mins lvl 3-4 RB 7' lvl 4    PROM IM PROM and LAD   CP x 6 mins w/ LAD on L   x5-6 mins B LE w/ HS and quad stretching      HS/glute/piri stretch  30s x 3   3x30 sec B  3x30 sec B     QS, SLR, hip abd  SLR ABC x 1     SLR circles cw/ccw 2x10 B     Active mobility        Leg press                                        Neuro Re Ed        Bridging    2x10 w/ TrA set    2x10 march X20      TrA progressions  TA iso 5s x 10     TA + fallouts x 10 B     TA + marches x 10 B     TA + bridge x 10      "rev     Squat training    reg x 3-4    W/ WS med/lat and pause x 5-6 total  rev Wall squat holds with swiss ball 10x10\"    2x10 reg squats w swiss ball      Hip Abd Progressions       hip abd walk RTB w/ YKB hold   4 laps 1/2 turf    Sled drag  NV      HE ant taps  2x8 SERENA SS BW, 12.5# B DB x8 B SERENA SS HE BW only 2x10 B    Reg split squat 2x10   Light weight only   TKE   Kneeling discussion and practice x 5 mins Tall kneel into half kneel with foam x10    HEP and POC review   X2-3 mins X2-3 mins       Step up 12\" BW x8 B, 15# uni DB 2x8 B  Ecc Maltese split squat no wt 2x10 each         Sled retro walk 50# 50'x4 Retro and push with 25#  4 laps 1/2 turf         RDL with foam roller 1x10 each with UE support  1x10 w/o UE support     RDL with 5# wt 2x10             Ther Act           stairs           gait           Sit<>stand                                    Modalities            heat             Ice                                                 "

## 2024-09-23 ENCOUNTER — CLINICAL SUPPORT (OUTPATIENT)
Dept: BARIATRICS | Facility: CLINIC | Age: 49
End: 2024-09-23

## 2024-09-23 VITALS — WEIGHT: 247.2 LBS | HEIGHT: 68 IN | BODY MASS INDEX: 37.47 KG/M2

## 2024-09-23 DIAGNOSIS — R63.5 ABNORMAL WEIGHT GAIN: Primary | ICD-10-CM

## 2024-09-23 PROCEDURE — WMDI30

## 2024-09-23 PROCEDURE — RECHECK

## 2024-09-23 NOTE — PROGRESS NOTES
"Weight Management Medical Nutrition Assessment  Pt is here today for menu planning. Current weight 247.2# which is a 0.2# weight gain from last month. Pt reports she was having Chipotle for lunch for one week straight and eating the full portion. She feels portion control has been her biggest obstacle. She did recently find \"copy cat\" recipes for the Chipotle that she has made and tastes very good. She would like to get into measuring her foods to be more mindful of her portions. Did also suggest, if she does eat out, to put 1/2 of the meal away before even starts eating as we tend to eat everything that is in front of us.  She will work on the logging to get a better understanding of where she is getting her calories. Suggested the Wikidot tray which she downloaded during the visit.  Pt will f/u in one month and purchase a bundle that will also include a body comp to assess fat mass vs muscle mass.      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  Tires to do 8am-4pm    Dietary Recall  Breakfast: omelet with Citizen of [place] no and veggies + greek yogurt (Oiko pro 20) OR bagel thin with 3 slices of Citizen of [place] no and egg OR chicken sausage with veggies/sweet potato + coffee --likes the choices we came up with last time and starts the day off well.  Snack: varies  Lunch: Was doing Chipotle for about a week straight, but since found a copy cat recipe she " has started to try.  OR tuna with alvarez (figured was doing more alvarez than should--will try with greek yogurt) on bagel thin or on sandwich thin  Snack: fruit OR quest bar OR picking at a caramel apple with nuts  Dinner: chicken sausage with veggies roasted OR chicken thighs + veggies OR rotisserie chicken made into chicken salad w/alvarez, celery, mrs sheldon--will try the non-fat greek yogurt also.  Snack: changed to yasso bars.     Beverages: 1 cup of coffee, water  Volume of beverage intake: 20oz coffee with sweet Italian creamer (probably about 2 tbsp to 1/4 cup), 32-64oz water per day    Weekends: no structure, sleeps later, tries not to skip meals  Cravings: both sweet and salty  Trouble area of day: varies    Frequency of Eating out: not too often  Food restrictions: none  Lives with her sister  Cooking: she does her own   Food Shopping: she does her own    Physical Activity  Activity: Started with PT  Frequency: none  Physical limitations/barriers to exercise: knee pain    Estimated Needs  [facility]yna Energy Needs (needs at 247#)  BMR: 1776 kcal  Maintenance calories (sedentary): 2131 kcal  1-2#/week loss (sedentary): 9710-7906 kcal  1-2#/week loss (light activity): 3309-4850 kcal    Protein: 75-95 grams (1.2-1.5g/kg IBW)  Fluid: 2205ml (35mL/kg IBW)    Nutrition Diagnosis  Yes;    Overweight/obesity  related to Excess energy intake as evidenced by  BMI more than normative standard for age and sex (obesity-grade II 35-39.9)       Nutrition Intervention    Nutrition Prescription  Calories: 0847-1379 kcal  Protein: 85-95 g  Fluid: 2200 ml    Meal Plan (Edilberto/Pro)  Breakfast: 300  Snack: 150  Lunch: 300-400  Snack: 150  Dinner: 400  Snack: --    Nutrition Education  Calorie controlled menu  Lean protein food choices  Healthy snack options  Food journaling tips    Nutrition Counseling  Strategies: meal planning, portion sizes, healthy snack choices, hydration, fiber intake, protein intake, exercise, food  logging    Monitoring and Evaluation:    Evaluation criteria  Energy Intake  Meet protein needs  Maintain adequate hydration  Monitor weekly weight  Meal planning/preparation  Food journal   Decreased portions at mealtimes and snacks  Physical activity     Barriers to change:none  Readiness to change: Preparation:  (Getting ready to change)   Comprehension: good  Expected Compliance: good             mouth negative

## 2024-09-24 ENCOUNTER — APPOINTMENT (OUTPATIENT)
Dept: PHYSICAL THERAPY | Facility: CLINIC | Age: 49
End: 2024-09-24
Payer: COMMERCIAL

## 2024-09-25 NOTE — PROGRESS NOTES
Daily Note     Today's date: 2024  Patient name: Deepa Wheeler  : 1975  MRN: [id number]  Referring provider: Farzad Weston DO  Dx:   Encounter Diagnosis     ICD-10-CM    1. Primary osteoarthritis of left knee  M17.12                      Subjective: Pt reports no new complaints. She is excited to report that she was able to job a little bit without pain. Feels she is improving.     Objective: requires cueing for proper weight shift and drive during split squat and kossack squat at TRX, corrects and maintains.    Assessment: Tolerated treatment well. Patient demonstrated fatigue post treatment and would benefit from continued PT. Does well w/ exercise progressions. Fatigue but no increase in pain by end of visit. Tolerates highest total workload intensity to date w/o adverse effects. She is able to jog in clinic w/o pain. Will continue w/ strength work at next visit, look to re-assess in prep for MD follow up.       Plan: Continue per plan of care.  Progress note during next visit. Step ups, TRX progressions, HS progressions     POC expires Auth Status Total   Visits  Start date  Expiration date PT/OT + Visit Limit? Co-Insurance    Review insurance again! Ask about self pay!     No                                             Visit/Unit Tracking  AUTH Status:  Date               Visits  Authed: Used                Remaining                    Date (Visit #)  (3)  (4)  (5)  (6)  (7)   Manual        DTM and TPR                                Exercise Diary         Ther Ex        Active w/u  re bike 8 min lvl 3   rec pre x 7 mins lvl 3-4  pre 6-7 mins lvl 3-4 RB 7' lvl 4 Deferred    PROM IM PROM and LAD   CP x 6 mins w/ LAD on L   x5-6 mins B LE w/ HS and quad stretching   Rev x 2-3 mins   HS/glute/piri stretch  30s x 3   3x30 sec B  3x30 sec B  S/l hip abd strict 2x10-12   QS, SLR, hip abd  SLR ABC x 1     SLR circles cw/ccw 2x10 B  rev   Active mobility        Leg press                   "                      Neuro Re Ed        Bridging    2x10 w/ TrA set    2x10 march X20   X20 reg  X20 march    TrA progressions  TA iso 5s x 10     TA + fallouts x 10 B     TA + marches x 10 B     TA + bridge x 10     rev     Squat training    reg x 3-4    W/ WS med/lat and pause x 5-6 total  rev Wall squat holds with swiss ball 10x10\"    2x10 reg squats w swiss ball   TRX squat x10, offset legs 2x10 each     TRX split squat 2x10    TRX kossack 2x12-15   Hip Abd Progressions       hip abd walk RTB w/ YKB hold   4 laps 1/2 turf    Sled drag  NV      HE ant taps  2x8 SERENA SS BW, 12.5# B DB x8 B SERENA SS HE BW only 2x10 B    Reg split squat 2x10      TKE   Kneeling discussion and practice x 5 mins Tall kneel into half kneel with foam x10    HEP and POC review   X2-3 mins X2-3 mins       Step up 12\" BW x8 B, 15# uni DB 2x8 B  Ecc Algerian split squat no wt 2x10 each         Sled retro walk 50# 50'x4 Retro and push with 25#  4 laps 1/2 turf  Pull/push 45# added 50'x3 each       RDL with foam roller 1x10 each with UE support  1x10 w/o UE support     RDL with 5# wt 2x10             Ther Act           stairs           gait           Sit<>stand                                    Modalities            heat             Ice                                                   "

## 2024-09-26 ENCOUNTER — OFFICE VISIT (OUTPATIENT)
Dept: PHYSICAL THERAPY | Facility: CLINIC | Age: 49
End: 2024-09-26
Payer: COMMERCIAL

## 2024-09-26 DIAGNOSIS — M17.12 PRIMARY OSTEOARTHRITIS OF LEFT KNEE: Primary | ICD-10-CM

## 2024-09-26 PROCEDURE — 97112 NEUROMUSCULAR REEDUCATION: CPT

## 2024-09-26 PROCEDURE — 97110 THERAPEUTIC EXERCISES: CPT

## 2024-10-01 ENCOUNTER — EVALUATION (OUTPATIENT)
Dept: PHYSICAL THERAPY | Facility: CLINIC | Age: 49
End: 2024-10-01
Payer: COMMERCIAL

## 2024-10-01 DIAGNOSIS — M17.12 PRIMARY OSTEOARTHRITIS OF LEFT KNEE: Primary | ICD-10-CM

## 2024-10-01 PROCEDURE — 97110 THERAPEUTIC EXERCISES: CPT

## 2024-10-01 PROCEDURE — 97112 NEUROMUSCULAR REEDUCATION: CPT

## 2024-10-01 NOTE — PROGRESS NOTES
Progress Note     Today's date: 10/1/2024  Patient name: Deepa Wheeler  : 1975  MRN: [id number]  Referring provider: Farzad Weston DO  Dx:   Encounter Diagnosis     ICD-10-CM    1. Primary osteoarthritis of left knee  M17.12                      Subjective: Pt notes good progress over her first 8 visits. Feels 50% better overall. Notes that she feels that exercises are really helping. Stairs are easier, able to jog w/ minimal pain, notes that work activities are much better. Encouraged by progress and would like to continue for the next couple of weeks. Functional update below:       Goals  Short term goals (3 weeks): ALL ACHIEVED   1) Pt will improve L knee AROM to 0-125 pain free.  2) Pt will improve B LE and core strength deficits by 1/3 grade MMT.   3) Pt will reports pain at worse <5/10.  4) Pt will initiate and progress HEP w/ special emphasis on functional knee ROM and core/hip strength.     Long term goals (6 weeks) ALL PROGRESSED   1) Pt will improve FOTO to at least 61. - achieved, d/c  2) Pt will perform two full weeks of all home activity and community ambulation w/o deficit related to L knee.   3) Pt will be functionally unlimited w/ use of stairs, step over step w/o UE support.  4) Pt will be independent and compliant w/ HEP in order to maximize functional benefit of skilled PT following d/c.     *LTG extended by 4 weeks until next PN     Pain  Current pain ratin  At best pain ratin  At worst pain ratin  Location: medial TF joint line, deep within L knee (points to patellar tendon, deep)  Quality: tight and sharp  Relieving factors: medications, rest, change in position and ice (meloxicam (prescribed), Tylenol, sleeps w/ leg elevated)  Aggravating factors: standing, walking, stair climbing, lifting and running (takes some time to loosen in the morning, gets worse at end of day)  Progression: improved since eval     Social Support  Steps to enter house: no  Stairs in house: no  "  Lives in: apartment  Lives with: sister.    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)        Objective     Palpation     Additional Palpation Details  TTP along medial TF joint line (L)   -10/1: no pain today    Neurological Testing     Sensation     Knee   Left Knee   Intact: Light touch    Right Knee   Intact: light touch     Active Range of Motion   Left Knee   Flexion: 115 degrees with pain  Extension: 6 degrees with pain   -10/1: 0-135    Right Knee   Normal active range of motion    Passive Range of Motion   Left Knee   Flexion: 124 degrees with pain  Extension: 3 degrees with pain   -10/1: 0-135    Right Knee   Normal passive range of motion    Strength/Myotome Testing     Left Knee   Flexion: 4+  Extension: 4+    Right Knee   Flexion: 4+  Extension: 4+    Additional Strength Details  LE Strength (R/L)    Hip  Grossly 4+/5 throughout    Core Strength: 12+5     *Indicates pain      Tests     Additional Tests Details  Negative for any structural instability     Ambulation     Ambulation: Level Surfaces     Additional Level Surfaces Ambulation Details  Min decreased step length and WB on L LE, fails to fully push off on L LE, min decreased TKE throughout midstance phase of WB on L   -10/1: no deficit today    General Comments:      Knee Comments  Sit<>stand: UE support on LE w/ R side WS   -10/1: no deficit     BW squat: not past 50% before R side WS     -10/1: full range w/o deficit     Stairs: TBA    -10/1: at least 12\" w/o UE support, min cueing for proper drive but able to correctly once cued     SLS: painful on L LE   -10/1: pain free    101: able to jog in straight lines w/o pain        Assessment: Tolerated treatment well. Patient demonstrated fatigue post treatment and would benefit from continued PT. Pt gas been re-assessed after 8 skilled therapy visits. She has made objective improvements in all areas of care to include strength, " "functional mobility, self reports of pain, and quality of life. She is appropriate for continued work on higher level strength to ensure that work activities can be performed consistently w/o pain. Will look to incorporate higher intensity lifting in future visits barring setback. Pt in agreement w/ plan.       Plan: Continue per plan of care. Sled progression, step up, review SERENA, light dynamic work     POC expires Auth Status Total   Visits  Start date  Expiration date PT/OT + Visit Limit? Co-Insurance    Review insurance again! Ask about self pay!     No                                             Visit/Unit Tracking  AUTH Status:  Date               Visits  Authed: Used                Remaining                    Date (Visit #) 8/29 (3) 9/9 (4) 9/11 (5) 9/17 (6) 9/26 (7) 10/1 (8) PN   Manual         DTM and TPR                                    Exercise Diary          Ther Ex         Active w/u  re bike 8 min lvl 3   rec pre x 7 mins lvl 3-4  pre 6-7 mins lvl 3-4 RB 7' lvl 4 Deferred  Pre 6 mins lvl 3-4    PROM IM PROM and LAD   CP x 6 mins w/ LAD on L   x5-6 mins B LE w/ HS and quad stretching   Rev x 2-3 mins X3-4 mins   HS/glute/piri stretch  30s x 3   3x30 sec B  3x30 sec B  S/l hip abd strict 2x10-12    QS, SLR, hip abd  SLR ABC x 1     SLR circles cw/ccw 2x10 B  rev    Active mobility         Leg press                                             Neuro Re Ed         Bridging    2x10 w/ TrA set    2x10 march X20   X20 reg  X20 march  X10 reg  March 2x10-15   TrA progressions  TA iso 5s x 10     TA + fallouts x 10 B     TA + marches x 10 B     TA + bridge x 10     rev      Squat training    reg x 3-4    W/ WS med/lat and pause x 5-6 total  rev Wall squat holds with swiss ball 10x10\"    2x10 reg squats w swiss ball   TRX squat x10, offset legs 2x10 each     TRX split squat 2x10    TRX kossack 2x12-15 TRX reg x10, single kickstand x10-15 B    TRX split squat 2x10    2x10-12   Hip Abd Progressions       hip abd " "walk RTB w/ YKB hold   4 laps 1/2 turf     Sled drag  NV       HE ant taps  2x8 SERENA SS BW, 12.5# B DB x8 B SERENA SS HE BW only 2x10 B    Reg split squat 2x10       TKE   Kneeling discussion and practice x 5 mins Tall kneel into half kneel with foam x10  See below    HEP and POC review   X2-3 mins X2-3 mins   Ev and update of POCx 5 mins      Step up 12\" BW x8 B, 15# uni DB 2x8 B  Ecc [place] split squat no wt 2x10 each          Sled retro walk 50# 50'x4 Retro and push with 25#  4 laps 1/2 turf  Pull/push 45# added 50'x3 each 45# 50'x2 each       RDL with foam roller 1x10 each with UE support  1x10 w/o UE support     RDL with 5# wt 2x10   Split squat from airex on floor 2x10-12 B            Ther Act            stairs            gait            Sit<>stand                                       Modalities             heat              Ice                                                        "

## 2024-10-03 ENCOUNTER — OFFICE VISIT (OUTPATIENT)
Dept: PHYSICAL THERAPY | Facility: CLINIC | Age: 49
End: 2024-10-03
Payer: COMMERCIAL

## 2024-10-03 DIAGNOSIS — M17.12 PRIMARY OSTEOARTHRITIS OF LEFT KNEE: Primary | ICD-10-CM

## 2024-10-03 PROCEDURE — 97110 THERAPEUTIC EXERCISES: CPT

## 2024-10-03 PROCEDURE — 97112 NEUROMUSCULAR REEDUCATION: CPT

## 2024-10-03 NOTE — PROGRESS NOTES
Daily Note     Today's date: 10/3/2024  Patient name: eDepa Wheeler  : 1975  MRN: [id number]  Referring provider: Farzad Weston DO  Dx:   Encounter Diagnosis     ICD-10-CM    1. Primary osteoarthritis of left knee  M17.12                      Subjective: Pt reports that she feels good, felt great after last visit. Encouraged by progress.       Objective: requires cueing for proper WS and transfer during slider work, corrects and is able to maintain.      Assessment: Tolerated treatment well. Patient demonstrated fatigue post treatment and would benefit from continued PT. Does well w/ exercise progressions today. Continues to show greater tolerance throughout each session, great intensity of exercises is met w/ solid form and deeper total motion. Will look to load motions more in future visits barring setback. Pt motivated by progress.       Plan: Continue per plan of care. Lateral sled, slider progressions, step progressions     POC expires Auth Status Total   Visits  Start date  Expiration date PT/OT + Visit Limit? Co-Insurance    Review insurance again! Ask about self pay!     No                                             Visit/Unit Tracking  AUTH Status:  Date               Visits  Authed: Used                Remaining                    Date (Visit #)  (3)  (4)  (5)  (6)  (7) 10/1 (8) PN 10/3 (9)    Manual          DTM and TPR                                        Exercise Diary           Ther Ex          Active w/u  re bike 8 min lvl 3   rec pre x 7 mins lvl 3-4  pre 6-7 mins lvl 3-4 RB 7' lvl 4 Deferred  Pre 6 mins lvl 3-4  Pre 7 min lvl 3-4    PROM IM PROM and LAD   CP x 6 mins w/ LAD on L   x5-6 mins B LE w/ HS and quad stretching   Rev x 2-3 mins X3-4 mins NV   HS/glute/piri stretch  30s x 3   3x30 sec B  3x30 sec B  S/l hip abd strict 2x10-12  rev   QS, SLR, hip abd  SLR ABC x 1     SLR circles cw/ccw 2x10 B  rev  rev   Active mobility       SERENA SS 2x10    20# uni hold 2x10  "  Leg press                                                  Neuro Re Ed          Bridging    2x10 w/ TrA set    2x10 march X20   X20 reg  X20 march  X10 reg  March 2x10-15    TrA progressions  TA iso 5s x 10     TA + fallouts x 10 B     TA + marches x 10 B     TA + bridge x 10     rev       Squat training    reg x 3-4    W/ WS med/lat and pause x 5-6 total  rev Wall squat holds with swiss ball 10x10\"    2x10 reg squats w swiss ball   TRX squat x10, offset legs 2x10 each     TRX split squat 2x10    TRX kossack 2x12-15 TRX reg x10, single kickstand x10-15 B    TRX split squat 2x10    2x10-12 TRX reg 2x15, kossack 2x15, split squat 2x15   Hip Abd Progressions       hip abd walk RTB w/ YKB hold   4 laps 1/2 turf      Sled drag  NV     Sliders post/lat 45 deg 2x10-12   HE ant taps  2x8 SERENA SS BW, 12.5# B DB x8 B SERENA SS HE BW only 2x10 B    Reg split squat 2x10        TKE   Kneeling discussion and practice x 5 mins Tall kneel into half kneel with foam x10  See below     HEP and POC review   X2-3 mins X2-3 mins   Ev and update of POCx 5 mins  X2-3 mins      Step up 12\" BW x8 B, 15# uni DB 2x8 B  Ecc Khmer split squat no wt 2x10 each           Sled retro walk 50# 50'x4 Retro and push with 25#  4 laps 1/2 turf  Pull/push 45# added 50'x3 each 45# 50'x2 each        RDL with foam roller 1x10 each with UE support  1x10 w/o UE support     RDL with 5# wt 2x10   Split squat from airex on floor 2x10-12 B           Gait on turf x 4-5 laps    Ther Act             stairs             gait             Sit<>stand                                          Modalities              heat               Ice                                                             "

## 2024-10-08 ENCOUNTER — OFFICE VISIT (OUTPATIENT)
Dept: PHYSICAL THERAPY | Facility: CLINIC | Age: 49
End: 2024-10-08
Payer: COMMERCIAL

## 2024-10-08 DIAGNOSIS — M17.12 PRIMARY OSTEOARTHRITIS OF LEFT KNEE: Primary | ICD-10-CM

## 2024-10-08 PROCEDURE — 97112 NEUROMUSCULAR REEDUCATION: CPT

## 2024-10-08 PROCEDURE — 97110 THERAPEUTIC EXERCISES: CPT

## 2024-10-08 NOTE — PROGRESS NOTES
Daily Note     Today's date: 10/8/2024  Patient name: Deepa Wheeler  : 1975  MRN: [id number]  Referring provider: Farzad Weston DO  Dx:   Encounter Diagnosis     ICD-10-CM    1. Primary osteoarthritis of left knee  M17.12                      Subjective: Pt reports no new complaints. Continues to improve. Feels stronger.       Objective: requires cueing w/ trap bar DL to perform proper leg drive vs lumbar ext - corrects and is able to maintain.       Assessment: Tolerated treatment well. Patient demonstrated fatigue post treatment and would benefit from continued PT. Does well w/ exercise progressions, fatigue but no pain by end. She responds extremely well to strength training, has had significant improvement in function w/o adverse effects. She sees MD later this week. We will look to continue this program either in structured PT or PT/gym combo moving forward. Assess results of MD visit and proceed accordingly.       Plan: Continue per plan of care. Discuss gym program, integrating HEP     POC expires Auth Status Total   Visits  Start date  Expiration date PT/OT + Visit Limit? Co-Insurance    Review insurance again! Ask about self pay!     No                                             Visit/Unit Tracking  AUTH Status:  Date               Visits  Authed: Used                Remaining                    Date (Visit #)  (3)  (4)  (5)  (6)  (7) 10/1 (8) PN 10/3 (9)  10/8 (10)   Manual           DTM and TPR                                            Exercise Diary            Ther Ex           Active w/u  re bike 8 min lvl 3   rec pre x 7 mins lvl 3-4  pre 6-7 mins lvl 3-4 RB 7' lvl 4 Deferred  Pre 6 mins lvl 3-4  Pre 7 min lvl 3-4  7 min pre lvl 3-4    PROM IM PROM and LAD   CP x 6 mins w/ LAD on L   x5-6 mins B LE w/ HS and quad stretching   Rev x 2-3 mins X3-4 mins NV    HS/glute/piri stretch  30s x 3   3x30 sec B  3x30 sec B  S/l hip abd strict 2x10-12  rev no   QS, SLR, hip abd  SLR  "ABC x 1     SLR circles cw/ccw 2x10 B  rev  rev S/l hip abd circles strict 2x10 each   Active mobility       SERENA SS 2x10    20# uni hold 2x10    Leg press                              Leg press 105# 3x6-8                          Neuro Re Ed           Bridging    2x10 w/ TrA set    2x10 march X20   X20 reg  X20 march  X10 reg  March 2x10-15  On peanut 2x10    March 2x12   TrA progressions  TA iso 5s x 10     TA + fallouts x 10 B     TA + marches x 10 B     TA + bridge x 10     rev        Squat training    reg x 3-4    W/ WS med/lat and pause x 5-6 total  rev Wall squat holds with swiss ball 10x10\"    2x10 reg squats w swiss ball   TRX squat x10, offset legs 2x10 each     TRX split squat 2x10    TRX kossack 2x12-15 TRX reg x10, single kickstand x10-15 B    TRX split squat 2x10    2x10-12 TRX reg 2x15, kossack 2x15, split squat 2x15 TRX reg x 20, split x20    Kossack x10-15    Lat lunge step 2x10    Post lunge step 2x10-15   Hip Abd Progressions       hip abd walk RTB w/ YKB hold   4 laps 1/2 turf    DL trap bar no weight 2x8   Sled drag  NV     Sliders post/lat 45 deg 2x10-12    HE ant taps  2x8 SERENA SS BW, 12.5# B DB x8 B SERENA SS HE BW only 2x10 B    Reg split squat 2x10         TKE   Kneeling discussion and practice x 5 mins Tall kneel into half kneel with foam x10  See below      HEP and POC review   X2-3 mins X2-3 mins   Ev and update of POCx 5 mins  X2-3 mins  Rev x2-3 mins      Step up 12\" BW x8 B, 15# uni DB 2x8 B  Ecc Iraqi split squat no wt 2x10 each            Sled retro walk 50# 50'x4 Retro and push with 25#  4 laps 1/2 turf  Pull/push 45# added 50'x3 each 45# 50'x2 each         RDL with foam roller 1x10 each with UE support  1x10 w/o UE support     RDL with 5# wt 2x10   Split squat from airex on floor 2x10-12 B            Gait on turf x 4-5 laps     Ther Act              stairs              gait              Sit<>stand                                             Modalities               heat             "    Ice

## 2024-10-10 ENCOUNTER — OFFICE VISIT (OUTPATIENT)
Dept: OBGYN CLINIC | Facility: CLINIC | Age: 49
End: 2024-10-10
Payer: COMMERCIAL

## 2024-10-10 ENCOUNTER — APPOINTMENT (OUTPATIENT)
Dept: PHYSICAL THERAPY | Facility: CLINIC | Age: 49
End: 2024-10-10
Payer: COMMERCIAL

## 2024-10-10 VITALS
BODY MASS INDEX: 37.71 KG/M2 | HEIGHT: 68 IN | DIASTOLIC BLOOD PRESSURE: 65 MMHG | SYSTOLIC BLOOD PRESSURE: 110 MMHG | WEIGHT: 248.8 LBS | HEART RATE: 62 BPM

## 2024-10-10 DIAGNOSIS — M17.12 PRIMARY OSTEOARTHRITIS OF LEFT KNEE: Primary | ICD-10-CM

## 2024-10-10 DIAGNOSIS — G89.29 CHRONIC PAIN OF LEFT KNEE: ICD-10-CM

## 2024-10-10 DIAGNOSIS — M25.562 CHRONIC PAIN OF LEFT KNEE: ICD-10-CM

## 2024-10-10 PROCEDURE — 99213 OFFICE O/P EST LOW 20 MIN: CPT | Performed by: ORTHOPAEDIC SURGERY

## 2024-10-10 NOTE — PROGRESS NOTES
"Assessment/Plan:  1. Primary osteoarthritis of left knee        2. Chronic pain of left knee          Scribe Attestation      I,:  Niels Waller am acting as a scribe while in the presence of the attending physician.:       I,:  Farzad Weston, DO personally performed the services described in this documentation    as scribed in my presence.:           Deepa is a pleasant 49-year-old female who returns today for follow-up evaluation for her left knee.  I am very pleased with the significant improvement she has experienced with physical therapy.  I encouraged her to continue with her exercise program.  She may also continue using Mobic as needed.  We did also discuss additional conservative strategies such as maintaining an appropriate weight and modifying activity as needed.  All of her questions and concerns were addressed today.  We will plan to see her back as needed.    Subjective: Follow-up evaluation for left knee    Patient ID: Deepa Wheeler is a 49 y.o. female who returns today for follow-up evaluation for her left knee.  At her last visit, she was referred to physical therapy and given a prescription for meloxicam.  At today's visit, she reports significant improvement with physical therapy.  She reports 0/10 pain today.  She does report some \"tightness\" about the knee at times but does not feel limited with her activity.  She is very pleased with her progress and has been increasing her exercise and activity level.  She denies any new injury or trauma.    Review of Systems   Constitutional:  Positive for activity change. Negative for chills, fever and unexpected weight change.   HENT:  Negative for hearing loss, nosebleeds and sore throat.    Eyes:  Negative for pain, redness and visual disturbance.   Respiratory:  Negative for cough, shortness of breath and wheezing.    Cardiovascular:  Negative for chest pain, palpitations and leg swelling.   Gastrointestinal:  Negative for abdominal pain, nausea and " vomiting.   Endocrine: Negative for polydipsia and polyuria.   Genitourinary:  Negative for dysuria and hematuria.   Musculoskeletal:  Positive for myalgias. Negative for arthralgias and joint swelling.        See HPI   Skin:  Negative for rash and wound.   Neurological:  Negative for dizziness, numbness and headaches.   Psychiatric/Behavioral:  Negative for decreased concentration and suicidal ideas. The patient is not nervous/anxious.          Past Medical History:   Diagnosis Date    Abnormal Pap smear of cervix     Blepharitis     Breast lump     last assessed: 3/18/2014    Depression     EBV seropositivity     last assessed: 3/18/2016    Herpes simplex infection     last assessed: 2014    Hirsutism     HPV (human papilloma virus) infection     Malaise and fatigue     last assessed: 3/18/2016    Multiple joint pain     last assessed: 3/1/2016    Myalgia     last assessed: 3/1/2016    Trauma     Since childhood       Past Surgical History:   Procedure Laterality Date    BREAST BIOPSY      Benign    BREAST FIBROADENOMA SURGERY      COLONOSCOPY      COLPOSCOPY   ??    Hemorrhoids    HYSTEROSCOPY W/ ENDOMETRIAL ABLATION      TOOTH EXTRACTION      TUBAL LIGATION         Family History   Problem Relation Age of Onset    Breast cancer Maternal Grandmother     Arthritis Maternal Grandmother     Cerebral aneurysm Mother     Brain cancer Family        Social History     Occupational History    Not on file   Tobacco Use    Smoking status: Former     Current packs/day: 0.00     Average packs/day: 1 pack/day for 27.6 years (27.6 ttl pk-yrs)     Types: Cigarettes     Start date: 10/31/1991     Quit date: 2019     Years since quittin.2    Smokeless tobacco: Never   Vaping Use    Vaping status: Never Used   Substance and Sexual Activity    Alcohol use: Yes     Alcohol/week: 7.0 standard drinks of alcohol     Types: 7 Glasses of wine per week    Drug use: Never    Sexual activity: Not  Currently     Partners: Male     Birth control/protection: None         Current Outpatient Medications:     acyclovir (ZOVIRAX) 5 % ointment, Apply to lips and around mouth 5 times per day for 4 days initiate as soon as possible after first signs and symptoms, Disp: 30 g, Rfl: 0    busPIRone (BUSPAR) 5 mg tablet, Take 5 mg by mouth 3 (three) times a day, Disp: , Rfl:     FLUoxetine (PROzac) 10 mg capsule, Take 1 capsule by mouth daily, Disp: , Rfl:     meloxicam (Mobic) 7.5 mg tablet, Take 1 tablet (7.5 mg total) by mouth daily, Disp: 30 tablet, Rfl: 0    terbinafine (LamISIL) 250 mg tablet, Take 1 tablet (250 mg total) by mouth daily, Disp: 30 tablet, Rfl: 0    ARIPiprazole (ABILIFY) 10 mg tablet, Take 1 tablet by mouth daily, Disp: , Rfl:     clindamycin-benzoyl peroxide (BENZACLIN) gel, Apply topically 2 (two) times a day, Disp: 50 g, Rfl: 1    cyclobenzaprine (FLEXERIL) 10 mg tablet, Take 10 mg by mouth 3 (three) times a day, Disp: , Rfl:     traZODone (DESYREL) 50 mg tablet, Take 1 tablet by mouth daily as needed, Disp: , Rfl:     valACYclovir (VALTREX) 1,000 mg tablet, , Disp: , Rfl:     Allergies   Allergen Reactions    Hydrocodone-Acetaminophen Abdominal Pain    Penicillins Itching       Objective:  Vitals:    10/10/24 0824   BP: 110/65   Pulse: 62       Body mass index is 38.39 kg/m².    Left Knee Exam     Tenderness   The patient is experiencing no tenderness.     Range of Motion   Extension:  0   Flexion:  130     Tests   Yuridia:  Medial - negative Lateral - negative  Varus: negative Valgus: negative    Other   Erythema: absent  Sensation: normal  Pulse: present  Swelling: none  Effusion: no effusion present    Comments:  Knee is stable at 0, 30, and 90  + peripatellar crepitus   - apley  No erythema or warmth           Observations   Left Knee   Negative for effusion.       Physical Exam  Vitals and nursing note reviewed.   Constitutional:       Appearance: Normal appearance. She is well-developed.    HENT:      Head: Normocephalic and atraumatic.      Right Ear: External ear normal.      Left Ear: External ear normal.   Eyes:      General: No scleral icterus.     Extraocular Movements: Extraocular movements intact.      Conjunctiva/sclera: Conjunctivae normal.   Cardiovascular:      Rate and Rhythm: Normal rate.   Pulmonary:      Effort: Pulmonary effort is normal. No respiratory distress.   Musculoskeletal:      Cervical back: Normal range of motion and neck supple.      Left knee: No effusion.      Instability Tests: Medial Yuridia test negative and lateral Yuridia test negative.      Comments: See Ortho exam   Skin:     General: Skin is warm and dry.   Neurological:      General: No focal deficit present.      Mental Status: She is alert and oriented to person, place, and time.   Psychiatric:         Behavior: Behavior normal.           This document was created using speech voice recognition software.   Grammatical errors, random word insertions, pronoun errors, and incomplete sentences are an occasional consequence of this system due to software limitations, ambient noise, and hardware issues.   Any formal questions or concerns about content, text, or information contained within the body of this dictation should be directly addressed to the provider for clarification.

## 2024-10-22 ENCOUNTER — CLINICAL SUPPORT (OUTPATIENT)
Dept: BARIATRICS | Facility: CLINIC | Age: 49
End: 2024-10-22

## 2024-10-22 VITALS — WEIGHT: 246.8 LBS | BODY MASS INDEX: 37.41 KG/M2 | HEIGHT: 68 IN

## 2024-10-22 DIAGNOSIS — R63.5 ABNORMAL WEIGHT GAIN: Primary | ICD-10-CM

## 2024-10-22 PROCEDURE — RECHECK

## 2024-10-22 PROCEDURE — DB3PK

## 2024-10-22 NOTE — PROGRESS NOTES
"Weight Management Medical Nutrition Assessment  Pt is here today for 1 of 3 RD bundle. Current weight 246.8# which is a 0.4# weight loss from last month. Pt reports she struggles mostly with portion sizes.  She does not report going into a meal too hungry, instead, reports, she is often just serving herself a larger portion. Suggested to start weighing and measuring to get a better understanding of how much she is eating. She is also struggling with the food log on the phone because she really tries to avoid her phone while she is at work, therefore provided her with a paper food journal that is 2 weeks (provided with 2) of logging. Pt is looking up a lot of new recipes that she is trying.  Pt is interested in meeting with [facility] to assist with wt loss.      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  Tires to do 8am-4pm    Dietary Recall  Breakfast: smoothie measuring 1/3 c berries and 1/2 banana + Pro 20 yog or w/ oikos 23gm drink or pro 25.  If she doesn't have the smoothie will will have an omelet with 2 eggs w/steak and spinach/mushrooms.  Snack: trying to stick to carrots and celery OR greek yogurt + 2 strawberries + few blueberries.  Lunch: 12 or 1pm:  copy cat chipolte OR salad with lettuce, cucumber, tomato, sunflower seeds, 1/2 avocado, turkey/chicken/andrea short cuts/steak (not weighing yet)  Snack: most often skipping, maybe an apple with PB--suggested " protein bar at this time  Dinner: 6pm-copy cat chipolte--steak, fajita veggies, corn and/or tomato salsa + guac homemade OR chicken sausage with veggies roasted OR chicken thighs + veggies sheet pan recipe OR rotisserie chicken made into chicken salad w/alvarez, celery, mrs dash--will try the non-fat greek yogurt also or garlic and herb low carb tortilla + 2 eggs, 3 [place] no, 1 slice cheese  Snack:  1 yasso bar    Beverages: 1 cup of coffee, water  Volume of beverage intake: 20oz coffee with sweet Italian creamer (probably about 2 tbsp to 1/4 cup), 32-64oz water per day    Weekends: no structure, sleeps later, tries not to skip meals  Cravings: both sweet and salty  Trouble area of day: varies    Frequency of Eating out: not too often  Food restrictions: none  Lives with her sister  Cooking: she does her own   Food Shopping: she does her own    Physical Activity  Activity: Done with PT and now plans on starting at the gym  Frequency: none  Physical limitations/barriers to exercise: knee pain    Estimated Needs  [employer] Needs (needs at 247#)  BMR: 1776 kcal  Maintenance calories (sedentary): 2131 kcal  1-2#/week loss (sedentary): 2525-3012 kcal  1-2#/week loss (light activity): 3489-9473 kcal    Protein: 75-95 grams (1.2-1.5g/kg IBW)  Fluid: 2205ml (35mL/kg IBW)    Nutrition Diagnosis  Yes;    Overweight/obesity  related to Excess energy intake as evidenced by  BMI more than normative standard for age and sex (obesity-grade II 35-39.9)       Nutrition Intervention    Nutrition Prescription  Calories: 1811-6984 kcal  Protein: 85-95 g  Fluid: 2200 ml    Meal Plan (Edilberto/Pro)  Breakfast: 300  Snack: 150  Lunch: 300-400  Snack: 150  Dinner: 400  Snack: --    Nutrition Education  Calorie controlled menu  Lean protein food choices  Healthy snack options  Food journaling tips    Nutrition Counseling  Strategies: meal planning, portion sizes, healthy snack choices, hydration, fiber intake, protein intake,  exercise, food logging    Monitoring and Evaluation:    Evaluation criteria  Energy Intake  Meet protein needs  Maintain adequate hydration  Monitor weekly weight  Meal planning/preparation  Food journal   Decreased portions at mealtimes and snacks  Physical activity     Barriers to change:none  Readiness to change: Preparation:  (Getting ready to change)   Comprehension: good  Expected Compliance: good

## 2024-11-20 ENCOUNTER — APPOINTMENT (OUTPATIENT)
Dept: RADIOLOGY | Facility: CLINIC | Age: 49
End: 2024-11-20
Payer: COMMERCIAL

## 2024-11-20 ENCOUNTER — OFFICE VISIT (OUTPATIENT)
Dept: OBGYN CLINIC | Facility: CLINIC | Age: 49
End: 2024-11-20
Payer: COMMERCIAL

## 2024-11-20 VITALS
WEIGHT: 251.6 LBS | DIASTOLIC BLOOD PRESSURE: 66 MMHG | HEIGHT: 68 IN | SYSTOLIC BLOOD PRESSURE: 113 MMHG | HEART RATE: 71 BPM | BODY MASS INDEX: 38.13 KG/M2

## 2024-11-20 DIAGNOSIS — G89.29 CHRONIC PAIN OF RIGHT KNEE: ICD-10-CM

## 2024-11-20 DIAGNOSIS — M25.561 CHRONIC PAIN OF RIGHT KNEE: ICD-10-CM

## 2024-11-20 DIAGNOSIS — M25.561 RIGHT KNEE PAIN, UNSPECIFIED CHRONICITY: ICD-10-CM

## 2024-11-20 DIAGNOSIS — M17.11 PRIMARY OSTEOARTHRITIS OF RIGHT KNEE: Primary | ICD-10-CM

## 2024-11-20 PROCEDURE — 99214 OFFICE O/P EST MOD 30 MIN: CPT | Performed by: ORTHOPAEDIC SURGERY

## 2024-11-20 PROCEDURE — 20610 DRAIN/INJ JOINT/BURSA W/O US: CPT | Performed by: ORTHOPAEDIC SURGERY

## 2024-11-20 PROCEDURE — 73562 X-RAY EXAM OF KNEE 3: CPT

## 2024-11-20 PROCEDURE — 73560 X-RAY EXAM OF KNEE 1 OR 2: CPT

## 2024-11-20 RX ORDER — BUPIVACAINE HYDROCHLORIDE 5 MG/ML
2 INJECTION, SOLUTION EPIDURAL; INTRACAUDAL
Status: COMPLETED | OUTPATIENT
Start: 2024-11-20 | End: 2024-11-20

## 2024-11-20 RX ORDER — TRIAMCINOLONE ACETONIDE 40 MG/ML
80 INJECTION, SUSPENSION INTRA-ARTICULAR; INTRAMUSCULAR
Status: COMPLETED | OUTPATIENT
Start: 2024-11-20 | End: 2024-11-20

## 2024-11-20 RX ADMIN — BUPIVACAINE HYDROCHLORIDE 2 ML: 5 INJECTION, SOLUTION EPIDURAL; INTRACAUDAL at 14:00

## 2024-11-20 RX ADMIN — TRIAMCINOLONE ACETONIDE 80 MG: 40 INJECTION, SUSPENSION INTRA-ARTICULAR; INTRAMUSCULAR at 14:00

## 2024-11-20 NOTE — PROGRESS NOTES
"Assessment/Plan:  1. Primary osteoarthritis of right knee  Large joint arthrocentesis: R knee      2. Chronic pain of right knee  XR knee 3 vw right non injury    XR knee 1 or 2 vw left    Large joint arthrocentesis: R knee        Scribe Attestation      I,:  Rosario Asencio am acting as a scribe while in the presence of the attending physician.:       I,:  Farzad Weston, DO personally performed the services described in this documentation    as scribed in my presence.:           Deepa is a pleasant 49 y.o. female who presents for initial evaluation of her right knee. She is symptomatic of her mild underlying osteoarthritis of the right knee today. I recommend she continue with home exercises for the knees. We discussed taking a daily anti-inflammatory medication; however, she has taken meloxicam in the past without relief. I offered her a corticosteroid injection of the right knee today instead. She consented to and tolerated the procedure well. Injection aftercare instructions were provided to her today. She may continue taking over the counter pain medications as needed. The soonest she may receive a corticosteroid injection of the right knee is in at least 3 months. She will follow-up on an as needed basis in regards to her right knee.    Large joint arthrocentesis: R knee  Universal Protocol:  Consent: Verbal consent obtained.  Risks and benefits: risks, benefits and alternatives were discussed  Consent given by: patient  Time out: Immediately prior to procedure a \"time out\" was called to verify the correct patient, procedure, equipment, support staff and site/side marked as required.  Patient understanding: patient states understanding of the procedure being performed  Site marked: the operative site was marked  Patient identity confirmed: verbally with patient  Supporting Documentation  Indications: pain   Procedure Details  Location: knee - R knee  Preparation: Patient was prepped and draped in the usual sterile " fashion  Needle size: 20 G  Approach: anterolateral  Medications administered: 80 mg triamcinolone acetonide 40 mg/mL; 2 mL bupivacaine (PF) 0.5 %    Patient tolerance: patient tolerated the procedure well with no immediate complications  Dressing:  Sterile dressing applied           Subjective: initial evaluation right knee    Patient ID: Deepa Wheeler is a 49 y.o. female who presents for initial evaluation of her right knee. Her right knee pain started this weekend with no specific injury. She indicates her knee pain is located on the medial joint line and posterior right knee. She denies any locking or catching of the right knee.She is leaving for [place] in the coming weeks to visit her children. She will be seated for a long period of time for the flight, and is hoping for some relief of her pain before the trip. She has not had any corticosteroid injections of the knees in the past.    Review of Systems   Constitutional:  Positive for activity change. Negative for chills and fever.   HENT:  Negative for ear pain and sore throat.    Eyes:  Negative for pain and visual disturbance.   Respiratory:  Negative for cough and shortness of breath.    Cardiovascular:  Negative for chest pain and palpitations.   Gastrointestinal:  Negative for abdominal pain and vomiting.   Genitourinary:  Negative for dysuria and hematuria.   Musculoskeletal:  Positive for arthralgias. Negative for back pain.   Skin:  Negative for color change and rash.   Neurological:  Negative for seizures and syncope.   All other systems reviewed and are negative.        Past Medical History:   Diagnosis Date    Abnormal Pap smear of cervix 2015    Blepharitis     Breast lump     last assessed: 3/18/2014    Depression 2011    EBV seropositivity     last assessed: 3/18/2016    Herpes simplex infection     last assessed: 8/19/2014    Hirsutism     HPV (human papilloma virus) infection 2015    Malaise and fatigue     last assessed: 3/18/2016     Multiple joint pain     last assessed: 3/1/2016    Myalgia     last assessed: 3/1/2016    Trauma     Since childhood       Past Surgical History:   Procedure Laterality Date    BREAST BIOPSY      Benign    BREAST FIBROADENOMA SURGERY      COLONOSCOPY      COLPOSCOPY   ??    Hemorrhoids    HYSTEROSCOPY W/ ENDOMETRIAL ABLATION      TOOTH EXTRACTION      TUBAL LIGATION         Family History   Problem Relation Age of Onset    Breast cancer Maternal Grandmother     Arthritis Maternal Grandmother     Cerebral aneurysm Mother     Brain cancer Family        Social History     Occupational History    Not on file   Tobacco Use    Smoking status: Former     Current packs/day: 0.00     Average packs/day: 1 pack/day for 27.6 years (27.6 ttl pk-yrs)     Types: Cigarettes     Start date: 10/31/1991     Quit date: 2019     Years since quittin.4    Smokeless tobacco: Never   Vaping Use    Vaping status: Never Used   Substance and Sexual Activity    Alcohol use: Yes     Alcohol/week: 7.0 standard drinks of alcohol     Types: 7 Glasses of wine per week    Drug use: Never    Sexual activity: Not Currently     Partners: Male     Birth control/protection: None         Current Outpatient Medications:     acyclovir (ZOVIRAX) 5 % ointment, Apply to lips and around mouth 5 times per day for 4 days initiate as soon as possible after first signs and symptoms, Disp: 30 g, Rfl: 0    ARIPiprazole (ABILIFY) 10 mg tablet, Take 1 tablet by mouth daily, Disp: , Rfl:     busPIRone (BUSPAR) 5 mg tablet, Take 5 mg by mouth 3 (three) times a day, Disp: , Rfl:     clindamycin-benzoyl peroxide (BENZACLIN) gel, Apply topically 2 (two) times a day, Disp: 50 g, Rfl: 1    cyclobenzaprine (FLEXERIL) 10 mg tablet, Take 10 mg by mouth 3 (three) times a day, Disp: , Rfl:     FLUoxetine (PROzac) 10 mg capsule, Take 1 capsule by mouth daily, Disp: , Rfl:     meloxicam (Mobic) 7.5 mg tablet, Take 1 tablet (7.5 mg total) by mouth daily, Disp: 30  tablet, Rfl: 0    traZODone (DESYREL) 50 mg tablet, Take 1 tablet by mouth daily as needed, Disp: , Rfl:     valACYclovir (VALTREX) 1,000 mg tablet, , Disp: , Rfl:     Allergies   Allergen Reactions    Hydrocodone-Acetaminophen Abdominal Pain    Penicillins Itching       Objective:  Vitals:    11/20/24 1358   BP: 113/66   Pulse: 71       Body mass index is 38.82 kg/m².    Right Knee Exam     Tenderness   The patient is experiencing tenderness in the medial joint line (posterior).    Range of Motion   Extension:  0   Flexion:  120     Tests   Varus: negative Valgus: negative    Other   Erythema: absent  Scars: absent  Sensation: normal  Pulse: present  Swelling: none  Effusion: no effusion present    Comments:  No effusion, erythema, or warmth  Stable at 0, 30, 90          Observations     Right Knee   Negative for effusion.       Physical Exam  Vitals and nursing note reviewed.   Constitutional:       Appearance: Normal appearance.   HENT:      Head: Normocephalic and atraumatic.      Right Ear: External ear normal.      Left Ear: External ear normal.      Nose: Nose normal.   Eyes:      General: No scleral icterus.     Extraocular Movements: Extraocular movements intact.      Conjunctiva/sclera: Conjunctivae normal.   Cardiovascular:      Rate and Rhythm: Normal rate.   Pulmonary:      Effort: Pulmonary effort is normal. No respiratory distress.   Musculoskeletal:         General: Tenderness present.      Cervical back: Normal range of motion and neck supple.      Right knee: No effusion.      Comments: See ortho exam   Skin:     General: Skin is warm and dry.   Neurological:      Mental Status: She is alert and oriented to person, place, and time.   Psychiatric:         Mood and Affect: Mood normal.         Behavior: Behavior normal.         I have personally reviewed pertinent films in PACS.  X-rays of right knee obtained in the office today demonstrate mild degenerative changes. Moderate degenerative changes of  the left knee. No acute fractures, dislocations, lytic or blastic lesions present.    This document was created using speech voice recognition software.   Grammatical errors, random word insertions, pronoun errors, and incomplete sentences are an occasional consequence of this system due to software limitations, ambient noise, and hardware issues.   Any formal questions or concerns about content, text, or information contained within the body of this dictation should be directly addressed to the provider for clarification.

## 2024-11-21 ENCOUNTER — CLINICAL SUPPORT (OUTPATIENT)
Dept: BARIATRICS | Facility: CLINIC | Age: 49
End: 2024-11-21

## 2024-11-21 VITALS — WEIGHT: 247.8 LBS | HEIGHT: 68 IN | BODY MASS INDEX: 37.56 KG/M2

## 2024-11-21 DIAGNOSIS — R63.5 ABNORMAL WEIGHT GAIN: Primary | ICD-10-CM

## 2024-11-21 PROCEDURE — RECHECK

## 2024-11-21 NOTE — PROGRESS NOTES
"Weight Management Medical Nutrition Assessment  Pt is here today for 2 of 3 RD bundle. Current weight 247.8# which is a 1# wt gain from last month and an overall gain of 0.8# since starting. Her sister had a heart attack due to a drug OD so things have been very stressful. She doesn't report to be snacking more often. Reviewed diet recall and pt appears to be making good food choices, but still question the portion size.  She reprots she was logging for about a week and then her sister had a heart attack.  She will get back to logging and measuring.  She is going to [place] Dec 3-13th to visit her two kids in the --has been there 2 times before. When she goes there she may cook some \"American food\" but will also eat some of the traditional cuisine where portions are usually smaller and \"fresher\" food. Did already sent protein shakes over to use in preparation. Pt has [facility] provider appt scheduled for when she comes back.     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  Tries to do 8am-4pm    Dietary Recall  Logged for one week but then her sister was sick  Breakfast: 5:30-6:30am-smoothie measuring 1/3 c berries and 1/2 banana + Pro 20 yog OR Oiko 23gm protein drink OR pro 25 yogurt.    9-9:30am:  At work 2-3oz of chicken, broccoli, spinach, 4 eggs, occ cheese--will eat 1/2 one day and 1/2 another son eating 2 eggs at a time OR bagel " thin with avocado or veggie cream cheese light   Snack: 1/8 cup of 3 different nuts--picks at this, may eat it all and other times may not finish it OR banana or small orange  Lunch: 1pm or 2-3 pm:  salad with lettuce, cucumber, tomato,1/2 avocado, turkey/chicken/andrea short cuts/steak (1lb split into 3 portions) (not weighing yet) OR low carb garlic and herb wrap--1-2 slices turkey or deli buffalo chicken breast, avocado, lettuce  Snack --  Dinner: 6pm-one night had fried chicken and mac & cheese OR skipping OR just having orange if has a later dinner OR salad OR grilled chicken tenders, brussels sprouts, rice (2 serving spoon size scoops)  Snack:  1 yasso bar + 1/2 sliced frozen banana OR oranges OR when granddaughter was visiting had some ice cream.     Beverages: 1 cup of coffee, water  Volume of beverage intake: 20oz coffee with sweet Italian creamer (probably about 2 tbsp to 1/4 cup), 48oz water per day most days    Weekends: no structure, sleeps later, tries not to skip meals  Cravings: both sweet and salty  Trouble area of day: varies    Frequency of Eating out: not too often  Food restrictions: none  Lives with her sister  Cooking: she does her own   Food Shopping: she does her own    Physical Activity  Activity: Done with PT and now plans on starting at the gym  Frequency: none  Physical limitations/barriers to exercise: knee pain    Estimated Needs  [facility] Energy Needs (needs at 247#)  BMR: 1776 kcal  Maintenance calories (sedentary): 2131 kcal  1-2#/week loss (sedentary): 1667-6579 kcal  1-2#/week loss (light activity): 2343-6305 kcal    Protein: 75-95 grams (1.2-1.5g/kg IBW)  Fluid: 2205ml (35mL/kg IBW)    Nutrition Diagnosis  Yes;    Overweight/obesity  related to Excess energy intake as evidenced by  BMI more than normative standard for age and sex (obesity-grade II 35-39.9)       Nutrition Intervention    Nutrition Prescription  Calories: 7458-2059 kcal  Protein: 85-95 g  Fluid: 2200  ml    Meal Plan (Edilberto/Pro)  Breakfast: 300  Snack: 150  Lunch: 300-400  Snack: 150  Dinner: 400  Snack: --    Nutrition Education  Calorie controlled menu  Lean protein food choices  Healthy snack options  Food journaling tips    Nutrition Counseling  Strategies: meal planning, portion sizes, healthy snack choices, hydration, fiber intake, protein intake, exercise, food logging    Monitoring and Evaluation:    Evaluation criteria  Energy Intake  Meet protein needs  Maintain adequate hydration  Monitor weekly weight  Meal planning/preparation  Food journal   Decreased portions at mealtimes and snacks  Physical activity     Barriers to change:none  Readiness to change: Preparation:  (Getting ready to change)   Comprehension: good  Expected Compliance: good

## 2024-12-17 ENCOUNTER — OFFICE VISIT (OUTPATIENT)
Dept: URGENT CARE | Facility: CLINIC | Age: 49
End: 2024-12-17
Payer: COMMERCIAL

## 2024-12-17 ENCOUNTER — OFFICE VISIT (OUTPATIENT)
Dept: BARIATRICS | Facility: CLINIC | Age: 49
End: 2024-12-17
Payer: COMMERCIAL

## 2024-12-17 VITALS
WEIGHT: 245 LBS | HEIGHT: 68 IN | DIASTOLIC BLOOD PRESSURE: 64 MMHG | BODY MASS INDEX: 37.13 KG/M2 | OXYGEN SATURATION: 98 % | TEMPERATURE: 98 F | SYSTOLIC BLOOD PRESSURE: 121 MMHG | HEART RATE: 82 BPM | RESPIRATION RATE: 18 BRPM

## 2024-12-17 VITALS
SYSTOLIC BLOOD PRESSURE: 122 MMHG | BODY MASS INDEX: 36.74 KG/M2 | HEART RATE: 98 BPM | WEIGHT: 242.4 LBS | OXYGEN SATURATION: 99 % | DIASTOLIC BLOOD PRESSURE: 70 MMHG | HEIGHT: 68 IN

## 2024-12-17 DIAGNOSIS — J06.9 ACUTE URI: Primary | ICD-10-CM

## 2024-12-17 DIAGNOSIS — E55.9 VITAMIN D DEFICIENCY: ICD-10-CM

## 2024-12-17 DIAGNOSIS — E66.01 CLASS 2 SEVERE OBESITY DUE TO EXCESS CALORIES WITH SERIOUS COMORBIDITY AND BODY MASS INDEX (BMI) OF 36.0 TO 36.9 IN ADULT (HCC): Primary | ICD-10-CM

## 2024-12-17 DIAGNOSIS — E66.812 CLASS 2 SEVERE OBESITY DUE TO EXCESS CALORIES WITH SERIOUS COMORBIDITY AND BODY MASS INDEX (BMI) OF 36.0 TO 36.9 IN ADULT (HCC): Primary | ICD-10-CM

## 2024-12-17 PROCEDURE — 99204 OFFICE O/P NEW MOD 45 MIN: CPT | Performed by: NURSE PRACTITIONER

## 2024-12-17 PROCEDURE — 99203 OFFICE O/P NEW LOW 30 MIN: CPT

## 2024-12-17 RX ORDER — BUPROPION HYDROCHLORIDE 150 MG/1
150 TABLET ORAL DAILY
Qty: 30 TABLET | Refills: 2 | Status: SHIPPED | OUTPATIENT
Start: 2024-12-17 | End: 2025-03-17

## 2024-12-17 RX ORDER — BROMPHENIRAMINE MALEATE, PSEUDOEPHEDRINE HYDROCHLORIDE, AND DEXTROMETHORPHAN HYDROBROMIDE 2; 30; 10 MG/5ML; MG/5ML; MG/5ML
5 SYRUP ORAL 4 TIMES DAILY PRN
Qty: 120 ML | Refills: 0 | Status: SHIPPED | OUTPATIENT
Start: 2024-12-17

## 2024-12-17 NOTE — PROGRESS NOTES
[facility]        NAME: Deepa Wheeler is a 49 y.o. female  : 1975    MRN: [id number]  DATE: 2024  TIME: 11:41 AM    Assessment and Plan   Acute URI [J06.9]  1. Acute URI  brompheniramine-pseudoephedrine-DM 30-2-10 MG/5ML syrup        URI symptoms started yesterday. Supportive management.     Patient Instructions       Most upper respiratory infections are viral and resolve on their own within 10-14 days. Antibiotics are not indicated for the viral infection, and are only prescribed if there is evidence for a bacterial infection. Viral infections are the most common, with bacterial infections only accounting for 0.5-2 percent of cases. Sometimes an upper respiratory infection may lead to secondary bacterial infection, such as bacterial sinusitis, in which case antibiotics would be indicated at that time. If your symptoms continue beyond 10-14 days or if you experience ongoing fevers, productive cough with green, brown, bloody phlegm production, you may have developed a bacterial infection. For the uncomplicated viral upper respiratory infection conservative management includes:    Fever and pain control:  Ibuprofen (Motrin) 600mg every 6 hours for fever, headaches, body aches   Ibuprofen is an NSAID. Please stop medication if you experience stomach/abdominal pain and report to your primary care provider.   Ask your primary care provider before you take NSAIDs if you are on any blood thinners, or if you have a history of heart disease, kidney disease, gastric bypass surgery, GI bleed, or poorly controlled high blood pressure.   May use acetaminophen (Tylenol) as directed on the bottle between doses of ibuprofen. Do not exceed 4,000mg of Tylenol a day.   Cough & Congestion:  Guaifenesin (Mucinex) as directed on the bottle for congestion and mucous-y cough.   Dextromethorphan (Delsym, Robitussin) for dry cough and cough suppression   Pseudoephedrine (Sudafed) for congestion and sinus  pressure   Sudafed may cause increased heart rate, irregular heart rate, and an increase in blood pressure. Please do not take Sudafed if you have a history of heart disease or high blood pressure.   Sudafed should not be taken if you are on anti-depressants such as those belonging to the class MAOIs or tricyclics.  Coricidin HBP (chlorpheniramine maleate) can be used as a decongestant in place of other options for those unable to take Sudafed.   Combination cough and cold such as Dimetapp and Mucinex DM also available  Sudafed PE Head Congestion +Flu Severe contains a combination of Sudafed, Tylenol, Mucinex, and Delsym  If prescribed, take Tessalon Pearles or Bromfed/Phenergan DM as directed  Avoid taking prescription cough/congestion medication and OTC options at the same time  Sore Throat:  Cepacol lozenges  Chloraseptic spray  Throat Coat tea  Warm salt water gargles   Vitamin/Minerals:  Vitamin D3 2,000 IU daily  Vitamin C 1000mg twice a day  Some studies suggest that Zinc 12.5-15mg every 2 hours while awake for 5 days may shorten symptom duration by 1-2 days  Other:   Plenty of fluids and rest  Cool mist humidifiers  Nasal sinus rinses such as NettiPot, Neimed, or Navage can be used to help flush out sinuses  Please only use distilled/sterile water that can be purchased at your local pharmacy  Nasal spray options:  Nasal steroid sprays such as Flonase, Nasonex, Nasacort may help with sinus congestion, itchy/watery eyes, clogged ears  These options must be used consistently for at least 2 weeks for full effect  Afrin nasal spray for quick acting congestion relief  Saline nasal spray for dry nose, irritation of the nasal passages  Follow up with PCP in 3-5 days  Proceed to the ED if symptoms worsen      If tests are performed, our office will contact you with results only if changes need to made to the care plan discussed with you at the visit. You can review your full results on [facility] Mychart.    Chief  Complaint     Chief Complaint   Patient presents with    Cough     Cough chest congestion ear fullness began yesterday recent flight from [place]          History of Present Illness       Cough  This is a new problem. The current episode started yesterday. The problem has been gradually worsening. The problem occurs every few minutes. The cough is Productive of sputum. Associated symptoms include ear pain (bilateral), nasal congestion and a sore throat. Pertinent negatives include no chest pain, chills, fever, headaches, myalgias, postnasal drip, rhinorrhea or shortness of breath. Risk factors for lung disease include travel ([place]). Treatments tried: Allegra D, flonase. The treatment provided moderate relief. Her past medical history is significant for environmental allergies. There is no history of asthma or COPD.       Review of Systems   Review of Systems   Constitutional:  Positive for fatigue. Negative for chills and fever.   HENT:  Positive for congestion, ear pain (bilateral) and sore throat. Negative for postnasal drip, rhinorrhea, sinus pressure and trouble swallowing.    Respiratory:  Positive for cough. Negative for chest tightness and shortness of breath.    Cardiovascular:  Negative for chest pain and palpitations.   Gastrointestinal:  Negative for abdominal pain, nausea and vomiting.   Genitourinary:  Negative for difficulty urinating.   Musculoskeletal:  Negative for myalgias.   Allergic/Immunologic: Positive for environmental allergies.   Neurological:  Negative for dizziness and headaches.         Current Medications       Current Outpatient Medications:     brompheniramine-pseudoephedrine-DM 30-2-10 MG/5ML syrup, Take 5 mL by mouth 4 (four) times a day as needed for congestion or cough, Disp: 120 mL, Rfl: 0    acyclovir (ZOVIRAX) 5 % ointment, Apply to lips and around mouth 5 times per day for 4 days initiate as soon as possible after first signs and symptoms (Patient not taking: Reported on  12/17/2024), Disp: 30 g, Rfl: 0    buPROPion (Wellbutrin XL) 150 mg 24 hr tablet, Take 1 tablet (150 mg total) by mouth daily (Patient not taking: Reported on 12/17/2024), Disp: 30 tablet, Rfl: 2    busPIRone (BUSPAR) 5 mg tablet, Take 5 mg by mouth 3 (three) times a day (Patient not taking: Reported on 12/17/2024), Disp: , Rfl:     FLUoxetine (PROzac) 10 mg capsule, Take 1 capsule by mouth daily (Patient not taking: Reported on 12/17/2024), Disp: , Rfl:     meloxicam (Mobic) 7.5 mg tablet, Take 1 tablet (7.5 mg total) by mouth daily (Patient not taking: Reported on 12/17/2024), Disp: 30 tablet, Rfl: 0    Current Allergies     Allergies as of 12/17/2024 - Reviewed 12/17/2024   Allergen Reaction Noted    Hydrocodone-acetaminophen Abdominal Pain 01/31/2020    Penicillins Itching 01/31/2020            The following portions of the patient's history were reviewed and updated as appropriate: allergies, current medications, past family history, past medical history, past social history, past surgical history and problem list.     Past Medical History:   Diagnosis Date    Abnormal Pap smear of cervix 2015    Anxiety 1990    Arthritis 2024    Blepharitis     Breast lump     last assessed: 3/18/2014    Depression 2011    EBV seropositivity     last assessed: 3/18/2016    Herpes simplex infection     last assessed: 8/19/2014    Hirsutism     HPV (human papilloma virus) infection 2015    Malaise and fatigue     last assessed: 3/18/2016    Multiple joint pain     last assessed: 3/1/2016    Myalgia     last assessed: 3/1/2016    Trauma     Since childhood       Past Surgical History:   Procedure Laterality Date    BREAST BIOPSY  2010    Benign    BREAST FIBROADENOMA SURGERY      COLONOSCOPY  2004    COLPOSCOPY  2003 ??    Hemorrhoids    HYSTEROSCOPY W/ ENDOMETRIAL ABLATION      TOOTH EXTRACTION      TUBAL LIGATION         Family History   Problem Relation Age of Onset    Breast cancer Maternal Grandmother     Arthritis Maternal  "Grandmother     Cancer Maternal Grandmother     Cerebral aneurysm Mother     Brain cancer Family          Medications have been verified.        Objective   /64   Pulse 82   Temp 98 °F (36.7 °C)   Resp 18   Ht 5' 8\" (1.727 m)   Wt 111 kg (245 lb)   LMP  (LMP Unknown)   SpO2 98%   BMI 37.25 kg/m²        Physical Exam     Physical Exam  Constitutional:       General: She is not in acute distress.     Appearance: She is not ill-appearing.   HENT:      Nose: Congestion present.      Mouth/Throat:      Mouth: Mucous membranes are moist.      Pharynx: Oropharynx is clear. Postnasal drip present. No posterior oropharyngeal erythema.   Eyes:      Pupils: Pupils are equal, round, and reactive to light.   Cardiovascular:      Rate and Rhythm: Normal rate and regular rhythm.      Pulses: Normal pulses.      Heart sounds: Normal heart sounds. No murmur heard.     No gallop.   Pulmonary:      Effort: Pulmonary effort is normal. No respiratory distress.      Breath sounds: Normal breath sounds. No wheezing.   Abdominal:      General: Abdomen is flat. Bowel sounds are normal. There is no distension.      Palpations: Abdomen is soft.      Tenderness: There is no abdominal tenderness.   Musculoskeletal:         General: Normal range of motion.      Cervical back: Normal range of motion.   Skin:     General: Skin is warm and dry.      Capillary Refill: Capillary refill takes less than 2 seconds.   Neurological:      Mental Status: She is alert and oriented to person, place, and time.                   "

## 2024-12-17 NOTE — ASSESSMENT & PLAN NOTE
- Discussed options of HealthyCORE-Intensive Lifestyle Intervention Program, Very Low Calorie Diet-VLCD, and Conservative Program and the role of weight loss medications.  - Patient is interested in pursuing Conservative Program and follow up visits with medical weight management provider.  - Explained the importance of making lifestyle changes in addition to starting any anti-obesity medications.   - Initial weight loss goal of 5-10% weight loss for improved health. Weight loss can improve patient's co-morbid conditions and/or prevent weight-related complications.  - Weight is not at goal and patient has been unable to achieve a meaningful weight loss above 5% using various programs and tools for more than 6 months  - Labs reviewed from 2020 - will get updated lab work    General Recommendations:  Nutrition:  Eat breakfast daily.  Do not skip meals.      Food log (ie.) www.myfitnesspal.com, sparkpeople.com, loseit.com, calorieking.com, etc.     Practice mindful eating.  Be sure to set aside time to eat, eat slowly, and savor your food.     Hydration:    At least 64oz of water daily.  No sugar sweetened beverages.  No juice (eat the fruit instead).     Exercise:  Studies have shown that the ideal exercise goal is somewhere between 150 to 300 minutes of moderate intensity exercise a week.  Start with exercising 10 minutes every other day and gradually increase physical activity with a goal of at least 150 minutes of moderate intensity exercise a week, divided over at least 3 days a week.  An example of this would be exercising 30 minutes a day, 5 days a week.  Resistance training can increase muscle mass and increase our resting metabolic rate.   FULL BODY resistance training is recommended 2-3 times a week.  Do not do this on consecutive days to allow for muscle recovery.     Aim for a bare minimum 5000 steps, even on days you do not exercise.     Monitoring:   Weigh yourself daily.  If this causes undue stress,  then just weigh yourself once a week.  Weigh yourself the same time of the day with the same amount of clothing on.  Preferably this should be done after waking up, before you eat, and with no clothing or minimal clothing on.     Specific Goals:  Patient lifestyle habits were reviewed and barriers to weight loss were addressed today. She is going to continue to follow with the dietician to keep her nutrition balanced.  Medications were discussed and patient would like to consider an oral medication for her weight loss journey. She is hopeful with some support for her cravings that she can get in control of her mindless eating habits. Bupropion was discussed and patient was in agreement to try this medication. May add naltrexone later if more control is needed. She will be started at 150mg once daily. Side effects were reviewed and patient denies any history of seizures.

## 2024-12-17 NOTE — PROGRESS NOTES
Assessment/Plan:    Class 2 severe obesity due to excess calories with serious comorbidity and body mass index (BMI) of 36.0 to 36.9 in adult (HCC)  - Discussed options of HealthyCORE-Intensive Lifestyle Intervention Program, Very Low Calorie Diet-VLCD, and Conservative Program and the role of weight loss medications.  - Patient is interested in pursuing Conservative Program and follow up visits with medical weight management provider.  - Explained the importance of making lifestyle changes in addition to starting any anti-obesity medications.   - Initial weight loss goal of 5-10% weight loss for improved health. Weight loss can improve patient's co-morbid conditions and/or prevent weight-related complications.  - Weight is not at goal and patient has been unable to achieve a meaningful weight loss above 5% using various programs and tools for more than 6 months  - Labs reviewed from 2020 - will get updated lab work    General Recommendations:  Nutrition:  Eat breakfast daily.  Do not skip meals.      Food log (ie.) www.OneRoof.com, sparkpeople.com, loseit.com, calorieking.com, etc.     Practice mindful eating.  Be sure to set aside time to eat, eat slowly, and savor your food.     Hydration:    At least 64oz of water daily.  No sugar sweetened beverages.  No juice (eat the fruit instead).     Exercise:  Studies have shown that the ideal exercise goal is somewhere between 150 to 300 minutes of moderate intensity exercise a week.  Start with exercising 10 minutes every other day and gradually increase physical activity with a goal of at least 150 minutes of moderate intensity exercise a week, divided over at least 3 days a week.  An example of this would be exercising 30 minutes a day, 5 days a week.  Resistance training can increase muscle mass and increase our resting metabolic rate.   FULL BODY resistance training is recommended 2-3 times a week.  Do not do this on consecutive days to allow for muscle  recovery.     Aim for a bare minimum 5000 steps, even on days you do not exercise.     Monitoring:   Weigh yourself daily.  If this causes undue stress, then just weigh yourself once a week.  Weigh yourself the same time of the day with the same amount of clothing on.  Preferably this should be done after waking up, before you eat, and with no clothing or minimal clothing on.     Specific Goals:  Patient lifestyle habits were reviewed and barriers to weight loss were addressed today. She is going to continue to follow with the dietician to keep her nutrition balanced.  Medications were discussed and patient would like to consider an oral medication for her weight loss journey. She is hopeful with some support for her cravings that she can get in control of her mindless eating habits. Bupropion was discussed and patient was in agreement to try this medication. May add naltrexone later if more control is needed. She will be started at 150mg once daily. Side effects were reviewed and patient denies any history of seizures.          Deepa was seen today for consult.    Diagnoses and all orders for this visit:    Class 2 severe obesity due to excess calories with serious comorbidity and body mass index (BMI) of 36.0 to 36.9 in adult (HCC)  -     Vitamin D 25 hydroxy; Future  -     Hemoglobin A1C; Future  -     TSH, 3rd generation with Free T4 reflex; Future  -     Lipid panel; Future  -     Comprehensive metabolic panel; Future  -     CBC and differential; Future  -     buPROPion (Wellbutrin XL) 150 mg 24 hr tablet; Take 1 tablet (150 mg total) by mouth daily (Patient not taking: Reported on 12/17/2024)  -     Vitamin D 25 hydroxy  -     Hemoglobin A1C  -     TSH, 3rd generation with Free T4 reflex  -     Lipid panel  -     Comprehensive metabolic panel  -     CBC and differential    Vitamin D deficiency  -     Vitamin D 25 hydroxy; Future  -     Vitamin D 25 hydroxy           Total time spent reviewing chart,  interviewing patient, examining patient, discussing plan, answering all questions, and documentin min, with >50% face-to-face time spent counseling patient on nonsurgical interventions for the treatment of excess weight. Discussed in detail nonsurgical options including intensive lifestyle intervention program, very low-calorie diet program and conservative program.  Discussed the role of weight loss medications.  Counseled patient on diet behavior and exercise modification for weight loss.    Follow up in approximately 3 months with Non-Surgical Physician/Advanced Practitioner.    Subjective:   Chief Complaint   Patient presents with    Consult     Pt is here for MWM consult , DX CLAUDE waiting for CPAP machine       Patient ID: Deepa Wheeler  is a 49 y.o. female with excess weight/obesity here to pursue weight management.  Previous notes and records have been reviewed.    Past Medical History:   Diagnosis Date    Abnormal Pap smear of cervix     Anxiety     Arthritis     Blepharitis     Breast lump     last assessed: 3/18/2014    Depression     EBV seropositivity     last assessed: 3/18/2016    Herpes simplex infection     last assessed: 2014    Hirsutism     HPV (human papilloma virus) infection     Malaise and fatigue     last assessed: 3/18/2016    Multiple joint pain     last assessed: 3/1/2016    Myalgia     last assessed: 3/1/2016    Trauma     Since childhood     Past Surgical History:   Procedure Laterality Date    BREAST BIOPSY      Benign    BREAST FIBROADENOMA SURGERY      COLONOSCOPY  2004    COLPOSCOPY   ??    Hemorrhoids    HYSTEROSCOPY W/ ENDOMETRIAL ABLATION      TOOTH EXTRACTION      TUBAL LIGATION         HPI:  Wt Readings from Last 20 Encounters:   24 111 kg (244 lb 6.4 oz)   24 111 kg (245 lb)   24 110 kg (242 lb 6.4 oz)   24 112 kg (247 lb 12.8 oz)   24 114 kg (251 lb 9.6 oz)   10/22/24 112 kg (246 lb 12.8 oz)   10/10/24 113 kg  (248 lb 12.8 oz)   09/23/24 112 kg (247 lb 3.2 oz)   09/16/24 112 kg (247 lb)   08/15/24 112 kg (247 lb)   08/15/24 113 kg (248 lb 6.4 oz)   08/10/24 112 kg (246 lb 6.4 oz)   06/03/20 114 kg (252 lb 3.2 oz)   05/20/20 114 kg (250 lb 9.6 oz)   02/27/20 109 kg (239 lb 9.6 oz)   01/31/20 108 kg (239 lb)   10/28/16 102 kg (225 lb 8 oz)   06/30/16 107 kg (236 lb 6 oz)   06/07/16 106 kg (233 lb)   05/02/16 105 kg (231 lb)       Patient presents today to medical weight management office for consult.  Patient states she was always thin and was able to manage her weight easily.  She has had 4 children and was able to lose her weight easily after each pregnancy. She had a stressful life event which caused her to go on antidepressant medications. She gained weight on this therapy and has had difficulty losing it since. She is currently not on any medication (she forgot her buspar and prozac at home when she went to [place] recently) and feels her mental health is stable.   She finds that she often is eating mindlessly - eating large portions before she even realizes how much she ate.   She reports many of the women in her family struggle with weigh gain later in life. She has always struggled with heavy periods but has never been diagnosed with any metabolic disease.   Patient was tried on phentermine in the past and it caused palpitations and anxiety.         Starting MWM weight: 242.4 lbs  Starting BMI: 36.86  Starting Waist Measurement: 45.5 in  Goal weight: 190 lbs    Obesity/Excess Weight:  Severity: Moderate  Onset:  last 5+ years    Modifiers: Diet and Exercise and Prescription Weight Loss Medications  Contributing factors: Poor Food Choices, Insufficient Physical Activity, Stress/Emotional Eating, Binge Eating, Menopause, Lack of knowledge of appropriate lifestyle changes, Medications, Depression, and Insufficient time to make appropriate lifestyle changes  Associated symptoms: comorbid conditions, fatigue, body  image issues, decreased self esteem, depression, and clothes do not fit      Nutrition Prescription  Calories: 3970-2026 kcal  Protein: 85-95 g  Fluid: 2200 ml      Diet recall:  Breakfast: egg sandwich with egg, cheese, [place] no, english muffin    9-9:30am:  At work 2-3oz of chicken, broccoli, spinach, 4 eggs, occ cheese--will eat 1/2 one day and 1/2 another son eating 2 eggs at a time OR bagel thin with avocado or veggie cream cheese light   Snack: 1/8 cup of 3 different nuts--picks at this, may eat it all and other times may not finish it OR banana or small orange  Lunch: 1pm or 2-3 pm:  salad with lettuce, cucumber, tomato,1/2 avocado, turkey/chicken/andrea short cuts/steak (1lb split into 3 portions) (not weighing yet) OR low carb garlic and herb wrap--1-2 slices turkey or deli buffalo chicken breast, avocado, lettuce  Snack --  Dinner: 6pm-one night had fried chicken and mac & cheese OR skipping OR just having orange if has a later dinner OR salad OR grilled chicken tenders, brussels sprouts, rice (2 serving spoon size scoops)  Snack:  1 yasso bar + 1/2 sliced frozen banana OR oranges OR when granddaughter was visiting had some ice cream.   Take out frequency: 3-4 times a week (chipotle)      Hydration: water, coffee, black tea and matcha  Alcohol: no  Smoking: no  Exercise: walks daily  Occupation: desk job  Sleep: will be starting CPAP soon      The following portions of the patient's history were reviewed and updated as appropriate: allergies, current medications, past family history, past medical history, past social history, past surgical history, and problem list.    Family History   Problem Relation Age of Onset    Breast cancer Maternal Grandmother     Arthritis Maternal Grandmother     Cancer Maternal Grandmother     Cerebral aneurysm Mother     Brain cancer Family         Review of Systems   Constitutional:  Negative for fatigue.   HENT:  Negative for sore throat.    Respiratory:  Negative for  "cough and shortness of breath.    Cardiovascular:  Negative for chest pain, palpitations and leg swelling.   Gastrointestinal:  Positive for constipation. Negative for abdominal pain, diarrhea and nausea.   Genitourinary:  Negative for dysuria.   Musculoskeletal:  Negative for arthralgias and back pain.   Skin:  Negative for rash.   Neurological:  Negative for headaches.   Psychiatric/Behavioral:  Negative for dysphoric mood. The patient is not nervous/anxious.        Objective:  /70 (BP Location: Right arm, Patient Position: Sitting, Cuff Size: Large)   Pulse 98   Ht 5' 8\" (1.727 m)   Wt 110 kg (242 lb 6.4 oz)   LMP  (LMP Unknown)   SpO2 99%   BMI 36.86 kg/m²     Physical Exam  Vitals and nursing note reviewed.   Constitutional:       Appearance: Normal appearance. She is obese.   HENT:      Head: Normocephalic.   Pulmonary:      Effort: Pulmonary effort is normal.   Neurological:      General: No focal deficit present.      Mental Status: She is alert and oriented to person, place, and time.   Psychiatric:         Mood and Affect: Mood normal.         Behavior: Behavior normal.         Thought Content: Thought content normal.         Judgment: Judgment normal.              Labs and Imaging  Recent labs and imaging have been personally reviewed.  Lab Results   Component Value Date    WBC 6.5 12/21/2024    HGB 13.3 12/21/2024    HCT 40.1 12/21/2024    MCV 93 12/21/2024     12/21/2024     Lab Results   Component Value Date    SODIUM 141 12/21/2024    K 4.3 12/21/2024     12/21/2024    CO2 24 12/21/2024    BUN 13 12/21/2024    CREATININE 0.80 12/21/2024    GLUC 94 12/21/2024    AST 13 12/21/2024    ALT 15 12/21/2024    TP 7.0 12/21/2024    TBILI 0.3 12/21/2024    EGFR 89 12/21/2024     Lab Results   Component Value Date    HGBA1C 5.7 (H) 12/21/2024     Lab Results   Component Value Date    IOE0OTNYCOQF 1.020 03/05/2016    TSH 0.812 12/21/2024     Lab Results   Component Value Date    " CHOLESTEROL 198 12/21/2024     Lab Results   Component Value Date    HDL 52 12/21/2024     Lab Results   Component Value Date    TRIG 71 12/21/2024     Lab Results   Component Value Date    LDLCALC 133 (H) 12/21/2024

## 2024-12-22 LAB
25(OH)D3+25(OH)D2 SERPL-MCNC: 26.9 NG/ML (ref 30–100)
ALBUMIN SERPL-MCNC: 4.3 G/DL (ref 3.8–4.9)
ALP SERPL-CCNC: 97 IU/L (ref 44–121)
ALT SERPL-CCNC: 15 IU/L (ref 0–32)
AST SERPL-CCNC: 13 IU/L (ref 0–40)
BASOPHILS # BLD AUTO: 0.1 X10E3/UL (ref 0–0.2)
BASOPHILS NFR BLD AUTO: 1 %
BILIRUB SERPL-MCNC: 0.3 MG/DL (ref 0–1.2)
BUN SERPL-MCNC: 13 MG/DL (ref 6–24)
BUN/CREAT SERPL: 16 (ref 9–23)
CALCIUM SERPL-MCNC: 9.2 MG/DL (ref 8.7–10.2)
CHLORIDE SERPL-SCNC: 103 MMOL/L (ref 96–106)
CHOLEST SERPL-MCNC: 198 MG/DL (ref 100–199)
CHOLEST/HDLC SERPL: 3.8 RATIO (ref 0–4.4)
CO2 SERPL-SCNC: 24 MMOL/L (ref 20–29)
CREAT SERPL-MCNC: 0.8 MG/DL (ref 0.57–1)
EGFR: 89 ML/MIN/1.73
EOSINOPHIL # BLD AUTO: 0.1 X10E3/UL (ref 0–0.4)
EOSINOPHIL NFR BLD AUTO: 1 %
ERYTHROCYTE [DISTWIDTH] IN BLOOD BY AUTOMATED COUNT: 11.7 % (ref 11.7–15.4)
EST. AVERAGE GLUCOSE BLD GHB EST-MCNC: 117 MG/DL
GLOBULIN SER-MCNC: 2.7 G/DL (ref 1.5–4.5)
GLUCOSE SERPL-MCNC: 94 MG/DL (ref 70–99)
HBA1C MFR BLD: 5.7 % (ref 4.8–5.6)
HCT VFR BLD AUTO: 40.1 % (ref 34–46.6)
HDLC SERPL-MCNC: 52 MG/DL
HGB BLD-MCNC: 13.3 G/DL (ref 11.1–15.9)
IMM GRANULOCYTES # BLD: 0 X10E3/UL (ref 0–0.1)
IMM GRANULOCYTES NFR BLD: 0 %
LDLC SERPL CALC-MCNC: 133 MG/DL (ref 0–99)
LYMPHOCYTES # BLD AUTO: 1.9 X10E3/UL (ref 0.7–3.1)
LYMPHOCYTES NFR BLD AUTO: 29 %
MCH RBC QN AUTO: 30.7 PG (ref 26.6–33)
MCHC RBC AUTO-ENTMCNC: 33.2 G/DL (ref 31.5–35.7)
MCV RBC AUTO: 93 FL (ref 79–97)
MONOCYTES # BLD AUTO: 0.4 X10E3/UL (ref 0.1–0.9)
MONOCYTES NFR BLD AUTO: 7 %
NEUTROPHILS # BLD AUTO: 4 X10E3/UL (ref 1.4–7)
NEUTROPHILS NFR BLD AUTO: 62 %
PLATELET # BLD AUTO: 296 X10E3/UL (ref 150–450)
POTASSIUM SERPL-SCNC: 4.3 MMOL/L (ref 3.5–5.2)
PROT SERPL-MCNC: 7 G/DL (ref 6–8.5)
RBC # BLD AUTO: 4.33 X10E6/UL (ref 3.77–5.28)
SL AMB VLDL CHOLESTEROL CALC: 13 MG/DL (ref 5–40)
SODIUM SERPL-SCNC: 141 MMOL/L (ref 134–144)
TRIGL SERPL-MCNC: 71 MG/DL (ref 0–149)
TSH SERPL DL<=0.005 MIU/L-ACNC: 0.81 UIU/ML (ref 0.45–4.5)
WBC # BLD AUTO: 6.5 X10E3/UL (ref 3.4–10.8)

## 2024-12-23 ENCOUNTER — RESULTS FOLLOW-UP (OUTPATIENT)
Dept: BARIATRICS | Facility: CLINIC | Age: 49
End: 2024-12-23

## 2024-12-27 ENCOUNTER — CLINICAL SUPPORT (OUTPATIENT)
Dept: BARIATRICS | Facility: CLINIC | Age: 49
End: 2024-12-27

## 2024-12-27 VITALS — WEIGHT: 244.4 LBS | BODY MASS INDEX: 37.16 KG/M2

## 2024-12-27 DIAGNOSIS — R63.5 ABNORMAL WEIGHT GAIN: ICD-10-CM

## 2024-12-27 DIAGNOSIS — F41.8 DEPRESSION WITH ANXIETY: Primary | ICD-10-CM

## 2024-12-27 PROCEDURE — RECHECK

## 2024-12-27 NOTE — PROGRESS NOTES
Patient presents for 1 hour Behavioral Health consult as a part of Medical Weight Management Program , current weight 244.4lbs.    Patient reports she is an emotional eater, after some self reflection she believes she eats to numb feelings. Reports having three meals a day, most likely to mindlessly or emotionally eat at night. She reports a history of significant trauma, has done some therapy and was on medication but went off of them after forgetting to pack her psychotropics for a trip at the beginning of December. She describes low energy, not finding kori in things she used to do and isolation, loss of a friendship. She believes her depression symptoms it's due to recent CLAUDE diagnosis, being busy at work and a change in usual holiday traditions. Discussed the impact that mental health and trama can have on weight management and health, the presence of stress hormones, the use of food and device for dopamine response. She does do some reflection on her eating choices, she engages in meditation to help with racing thoughts which does help with being present focused. Suggested patient consider reconnecting to therapy to process past trauma, coping skills, poor connection with others and benefits of medication. She was recently diagnosed with ADHD, she recognizes that she got busy with work and also likely avoiding their recommendation of medication. Referral for [facility] put in chart, resource list provided. Suggested patient prepare foods for easy access at night when she's most likely to snack, to reflect on emotional eating episodes and to use meditation and mindfulness.     Next Appointment:  with RD on 1/14

## 2025-01-14 ENCOUNTER — CLINICAL SUPPORT (OUTPATIENT)
Dept: BARIATRICS | Facility: CLINIC | Age: 50
End: 2025-01-14

## 2025-01-14 VITALS — BODY MASS INDEX: 37.07 KG/M2 | WEIGHT: 244.6 LBS | HEIGHT: 68 IN

## 2025-01-14 DIAGNOSIS — E66.812 CLASS 2 SEVERE OBESITY DUE TO EXCESS CALORIES WITH SERIOUS COMORBIDITY AND BODY MASS INDEX (BMI) OF 36.0 TO 36.9 IN ADULT (HCC): ICD-10-CM

## 2025-01-14 DIAGNOSIS — R63.5 ABNORMAL WEIGHT GAIN: Primary | ICD-10-CM

## 2025-01-14 DIAGNOSIS — E66.01 CLASS 2 SEVERE OBESITY DUE TO EXCESS CALORIES WITH SERIOUS COMORBIDITY AND BODY MASS INDEX (BMI) OF 36.0 TO 36.9 IN ADULT (HCC): ICD-10-CM

## 2025-01-14 PROCEDURE — RECHECK

## 2025-01-14 RX ORDER — BUPROPION HYDROCHLORIDE 150 MG/1
150 TABLET ORAL 2 TIMES DAILY
Qty: 60 TABLET | Refills: 2 | Status: SHIPPED | OUTPATIENT
Start: 2025-01-14 | End: 2025-04-14

## 2025-01-14 NOTE — PROGRESS NOTES
"Weight Management Medical Nutrition Assessment  Pt is here today for 3 of 3 RD bundle. Current weight 244.6# which is a 2.4# net wt loss (1% TBW). Pt had a goo trip to [place] to visit her daughter. She had a good trip and when was home went into the holidays. She is down from her start weight and has maintained since her last visit in the office. Reviewed her diet recall and her portions are likely bigger than she thinks ie: takes a 1# steak and cuts into 3 poritons and uses one portion either on her salad or in her omelet. Advised that was 5oz, suggested to cut into 5 portion of 3oz each.  Pt currently not logging. Reports gets very busy at work. Pt does appear to have a similar breakfast each  morning and she packs her lunch and snacks so suggest to pre log the night before breakfast, snacks and lunch so she can see how many calories it is and better plan for dinner.  Pt will try.  Will f/u in 1 month for body comp and in March with M provider.     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  Tries to do 8am-4pm    Dietary Recall  Breakfast: 5:30-6:30am-smoothie measuring 1/3 c berries and 1/2 banana + Pro drink 23 pro OR Omelet 2 eggs + 5oz steak (but didn't realize was doing 5oz, though was 3oz.  Suggested to make her 1# steak last 5 servings) + veggies OR oatmeal (maple brown sugar) 1-2 packs w/orange or banana (suggested to add " protein if has just oatmeal)  9-9:30am:  At work 5oz (suggested to decrease to 3oz) of chicken/steak, broccoli, spinach, 2 eggs, occ cheese  Snack: mostly fruits  Lunch: 1pm or 2-3 pm:  salad with lettuce, cucumber, tomato,1/2 avocado, turkey/chicken/andrea short cuts/steak (1lb split into 3 portions, suggested to spilt into 5 portions) (not weighing yet) OR low carb garlic and herb wrap--1-2 slices turkey or deli buffalo chicken breast, avocado, lettuce  Snack -some premade guac + toastitos (suggested 1/2 serving + 75cals of guac)  Dinner: 6pm-chicken/steak/chicken sausage + veggie + starch OR grilled or breaded chicken tenders, brussels sprouts, rice (2 serving spoon size scoops) OR 1 light english muffin pizza OR 2 chicken sausage links + small red potatoes + broccoli  Snack:  1 yasso bar + 1/2 sliced frozen banana OR   Had 1# steak and then divides it in thirds and seasons it--uses in salads or omelets. Suggested to break into 5 servings.   Some constipation:  1-2 colace per day. Suggested miralax at night in warm beverage  When was sick with URI stopped the coffee with creamer.     Beverages: 1 cup of coffee (has cut out the past month), water  Volume of beverage intake: 48-64oz water most days  Weekends: no structure, sleeps later, tries not to skip meals  Cravings: both sweet and salty  Trouble area of day: varies    Frequency of Eating out: not too often  Food restrictions: none  Lives with her sister  Cooking: she does her own   Food Shopping: she does her own    Physical Activity  Activity: Done with PT and now plans on starting at the gym  Frequency: none  Physical limitations/barriers to exercise: knee pain    Estimated Needs  [place]osper  Chyna Energy Needs (needs at 247#)  BMR: 1776 kcal  Maintenance calories (sedentary): 2131 kcal  1-2#/week loss (sedentary): 2282-9090 kcal  1-2#/week loss (light activity): 8297-9486 kcal    Protein: 75-95 grams (1.2-1.5g/kg IBW)  Fluid: 2205ml (35mL/kg IBW)    Nutrition  Diagnosis  Yes;    Overweight/obesity  related to Excess energy intake as evidenced by  BMI more than normative standard for age and sex (obesity-grade II 35-39.9)       Nutrition Intervention    Nutrition Prescription  Calories: 0873-3383 kcal  Protein: 85-95 g  Fluid: 2200 ml    Meal Plan (Edilberto/Pro)  Breakfast: 300  Snack: 150  Lunch: 300-400  Snack: 150  Dinner: 400  Snack: --    Nutrition Education  Calorie controlled menu  Lean protein food choices  Healthy snack options  Food journaling tips--pre-log the night before    Nutrition Counseling  Strategies: meal planning, portion sizes, healthy snack choices, hydration, fiber intake, protein intake, exercise, food logging    Monitoring and Evaluation:    Evaluation criteria  Energy Intake  Meet protein needs  Maintain adequate hydration  Monitor weekly weight  Meal planning/preparation  Food journal   Decreased portions at mealtimes and snacks  Physical activity     Barriers to change:none  Readiness to change: Action   Comprehension: good  Expected Compliance: good

## 2025-02-11 ENCOUNTER — TELEPHONE (OUTPATIENT)
Age: 50
End: 2025-02-11

## 2025-02-12 DIAGNOSIS — Z00.6 ENCOUNTER FOR EXAMINATION FOR NORMAL COMPARISON OR CONTROL IN CLINICAL RESEARCH PROGRAM: ICD-10-CM

## 2025-03-31 ENCOUNTER — OFFICE VISIT (OUTPATIENT)
Dept: OBGYN CLINIC | Facility: CLINIC | Age: 50
End: 2025-03-31
Payer: COMMERCIAL

## 2025-03-31 VITALS
BODY MASS INDEX: 37.25 KG/M2 | HEART RATE: 67 BPM | SYSTOLIC BLOOD PRESSURE: 100 MMHG | DIASTOLIC BLOOD PRESSURE: 60 MMHG | WEIGHT: 245 LBS | OXYGEN SATURATION: 97 %

## 2025-03-31 DIAGNOSIS — R92.30 DENSE BREASTS: ICD-10-CM

## 2025-03-31 DIAGNOSIS — B37.31 YEAST VAGINITIS: ICD-10-CM

## 2025-03-31 DIAGNOSIS — Z01.419 WELL WOMAN EXAM WITH ROUTINE GYNECOLOGICAL EXAM: ICD-10-CM

## 2025-03-31 DIAGNOSIS — N91.2 AMENORRHEA: ICD-10-CM

## 2025-03-31 DIAGNOSIS — Z12.31 ENCOUNTER FOR SCREENING MAMMOGRAM FOR MALIGNANT NEOPLASM OF BREAST: Primary | ICD-10-CM

## 2025-03-31 PROCEDURE — 87070 CULTURE OTHR SPECIMN AEROBIC: CPT | Performed by: STUDENT IN AN ORGANIZED HEALTH CARE EDUCATION/TRAINING PROGRAM

## 2025-03-31 PROCEDURE — G0476 HPV COMBO ASSAY CA SCREEN: HCPCS | Performed by: STUDENT IN AN ORGANIZED HEALTH CARE EDUCATION/TRAINING PROGRAM

## 2025-03-31 PROCEDURE — G0145 SCR C/V CYTO,THINLAYER,RESCR: HCPCS | Performed by: STUDENT IN AN ORGANIZED HEALTH CARE EDUCATION/TRAINING PROGRAM

## 2025-03-31 PROCEDURE — 99386 PREV VISIT NEW AGE 40-64: CPT | Performed by: STUDENT IN AN ORGANIZED HEALTH CARE EDUCATION/TRAINING PROGRAM

## 2025-03-31 RX ORDER — BUSPIRONE HYDROCHLORIDE 15 MG/1
TABLET ORAL
COMMUNITY
Start: 2025-03-15

## 2025-03-31 RX ORDER — FLUCONAZOLE 150 MG/1
150 TABLET ORAL ONCE
Qty: 1 TABLET | Refills: 0 | Status: SHIPPED | OUTPATIENT
Start: 2025-03-31 | End: 2025-03-31

## 2025-03-31 NOTE — PROGRESS NOTES
[facility]  [facility] Woman Annual Exam  Mylene Ambrosio MD  03/31/25    Assessment   50 y.o. postmenopausal female who is sexually active and s/p ablation presenting for annual exam.     Plan     Cervical cancer screening: Last Pap: per patient last year- was normal- h/o of LSIL and HRHPV. Pap with cotesting completed today. The current ASCCP guidelines were reviewed.  The low risk patient will receive pap smear screening every 3 years or pap with HPV co-testing every 5 years. With 10 years of normal pap testing pap smears may be discontinued at age 65.  STD screening: The patient declined STD testing given not sexually active.  Vaginal discharge and pruritus- genital culture completed and diflucan sent for empiric therapy given exam today.  Breast cancer screening: History of right breast biopsy in 2022 - mammogram ordered today.  We reviewed her heterogeneous breast density which we discussed can make it more difficult to see small masses and reviewed adjunct in her breast imaging with an ABUS which she was accepting of and was ordered today.  We did review checking with your insurance carrier to ensure coverage prior to scheduling.  Reviewed ACOG recommendations of yearly mammogram for breast cancer screening   Patient with history of ablation and would like FSH testing-we did review that this does not change her management very much as we would just treat symptoms if she had them but given she is unsure if she has not had menses for over a year she would like this testing completed.  This testing can also be helpful if there is any postmenopausal bleeding to ensure this is in fact postmenopausal bleeding.    I emphasized the importance of an annual pelvic and breast exam.   I have discussed the importance of monthly self-breast exams, exercise and healthy diet as well as adequate intake of calcium and vitamin D.     All questions have been answered to her  satisfaction.  __________________________________________________________________      Subjective     50 y.o. postmenopausal female who is sexually active and s/p endometrial ablation presenting for annual exam. She is with complaint of vaginal discharge and pruritus    GYN  Complaints: discharge and pruritus  Denies genital ulcers and pelvic pain  Ablation in  and no bleeding since  Menopausal symptoms: hot flashes are improving and weight gain  Sexually active: No  Hx STI: reports HPV  Hx Abnormal pap: 2015- LSIL/ +HRHPV  Last pap: reports last year at [facility]    OB      x4      Complaints: denies  Denies urinary frequency, hematuria, urinary hesitancy, urinary retention, and dysuria  Stress incontinence and some urge incontinence    BREAST  Complaints: denies  Denies: breast lump, breast tenderness, changed mole, dryness, nipple discharge, pruritus, rash, skin color change, and skin lesion(s)  Last mammogram: last I can see in  which was [facility] 4/5  Personal hx: Right breast biopsy in - fibroma in breast per patient  Family hx: denies fhx of uterine, ovarian, or colon cancers  Maternal grandmother breast cancer  Patient practice breast self awareness.    GENERAL  PMH reviewed/updated and is as below.   Patient does follow with a PCP.  Works as a  for group home.  Exercise: trying to increase- 10K steps and tries to walk more frequently  Diet: diet has improved- following with weight management  Denies domestic violence.    Past Medical History:   Diagnosis Date    Abnormal Pap smear of cervix     Allergic     Anxiety     Arthritis     Blepharitis     Breast lump     last assessed: 3/18/2014    Depression     EBV seropositivity     last assessed: 3/18/2016    Herpes simplex infection     last assessed: 2014    Hirsutism     HPV (human papilloma virus) infection     Malaise and fatigue     last assessed: 3/18/2016    Multiple joint pain      last assessed: 3/1/2016    Myalgia     last assessed: 3/1/2016    Trauma     Since childhood       Past Surgical History:   Procedure Laterality Date    BREAST BIOPSY      Benign    BREAST FIBROADENOMA SURGERY      COLONOSCOPY  2004    COLPOSCOPY   ??    Hemorrhoids    HYSTEROSCOPY W/ ENDOMETRIAL ABLATION      TOOTH EXTRACTION      TUBAL LIGATION           Current Outpatient Medications:     buPROPion (Wellbutrin XL) 150 mg 24 hr tablet, Take 1 tablet (150 mg total) by mouth 2 (two) times a day, Disp: 60 tablet, Rfl: 2    busPIRone (BUSPAR) 15 mg tablet, , Disp: , Rfl:     acyclovir (ZOVIRAX) 5 % ointment, Apply to lips and around mouth 5 times per day for 4 days initiate as soon as possible after first signs and symptoms (Patient not taking: Reported on 2024), Disp: 30 g, Rfl: 0    Allergies   Allergen Reactions    Hydrocodone-Acetaminophen Abdominal Pain    Penicillins Itching       Social History     Socioeconomic History    Marital status: Single     Spouse name: Not on file    Number of children: Not on file    Years of education: Not on file    Highest education level: Not on file   Occupational History    Not on file   Tobacco Use    Smoking status: Former     Current packs/day: 0.00     Average packs/day: 1 pack/day for 27.6 years (27.6 ttl pk-yrs)     Types: Cigarettes     Start date: 10/31/1991     Quit date: 2019     Years since quittin.7    Smokeless tobacco: Never   Vaping Use    Vaping status: Never Used   Substance and Sexual Activity    Alcohol use: Yes     Alcohol/week: 1.0 standard drink of alcohol     Types: 1 Glasses of wine per week     Comment: Occasionally    Drug use: Never    Sexual activity: Not Currently     Partners: Male     Birth control/protection: Surgical, Female Sterilization   Other Topics Concern    Not on file   Social History Narrative    Not on file     Social Drivers of Health     Financial Resource Strain: Not on file   Food Insecurity: Not on file    Transportation Needs: Not on file   Physical Activity: Not on file   Stress: Not on file   Social Connections: Not on file   Intimate Partner Violence: Not on file   Housing Stability: Not on file     Review of Systems  Review of Systems  Per HPI    Objective  /60   Pulse 67   Wt 111 kg (245 lb)   SpO2 97%   BMI 37.25 kg/m²      Physical Exam:  Physical Exam  Vitals reviewed.   Constitutional:       General: She is not in acute distress.     Appearance: Normal appearance. She is not ill-appearing or diaphoretic.   HENT:      Head: Normocephalic and atraumatic.   Cardiovascular:      Rate and Rhythm: Normal rate.      Heart sounds: No murmur heard.  Pulmonary:      Effort: Pulmonary effort is normal. No respiratory distress.   Abdominal:      Palpations: Abdomen is soft.      Tenderness: There is no abdominal tenderness. There is no guarding or rebound.   Genitourinary:     Comments: Vulva mildly atrophic without lesions.  Normal Bartholin's and [place]'s glands.  Urethra is midline without prolapse.  On speculum examination the vagina appears grossly normal, cervix appears multiparous with no lesions.  No bleeding was noted.  There was some thick curd-like discharge on the vaginal walls consistent with yeast vaginitis given symptoms.  On bimanual exam there was an anteverted and mobile uterus adnexal masses were not palpated although this was limited by patient body habitus.  No tenderness was noted on exam.  Musculoskeletal:      Right lower leg: No edema.      Left lower leg: No edema.   Skin:     General: Skin is warm and dry.   Neurological:      Mental Status: She is alert and oriented to person, place, and time.   Psychiatric:         Mood and Affect: Mood normal.         Behavior: Behavior normal.       Breast: Prior breast scar on the right breast from biopsy previous.  No palpated masses in the breast.  No overlying skin changes.  No nipple discharge.  No axillary masses.

## 2025-04-01 LAB
HPV HR 12 DNA CVX QL NAA+PROBE: NEGATIVE
HPV16 DNA CVX QL NAA+PROBE: NEGATIVE
HPV18 DNA CVX QL NAA+PROBE: NEGATIVE

## 2025-04-04 ENCOUNTER — RESULTS FOLLOW-UP (OUTPATIENT)
Dept: OBGYN CLINIC | Facility: CLINIC | Age: 50
End: 2025-04-04

## 2025-04-04 LAB
BACTERIA GENITAL AEROBE CULT: NORMAL
LAB AP GYN PRIMARY INTERPRETATION: NORMAL
Lab: NORMAL

## 2025-04-10 DIAGNOSIS — E66.01 CLASS 2 SEVERE OBESITY DUE TO EXCESS CALORIES WITH SERIOUS COMORBIDITY AND BODY MASS INDEX (BMI) OF 36.0 TO 36.9 IN ADULT (HCC): ICD-10-CM

## 2025-04-10 DIAGNOSIS — E66.812 CLASS 2 SEVERE OBESITY DUE TO EXCESS CALORIES WITH SERIOUS COMORBIDITY AND BODY MASS INDEX (BMI) OF 36.0 TO 36.9 IN ADULT (HCC): ICD-10-CM

## 2025-04-10 RX ORDER — BUPROPION HYDROCHLORIDE 150 MG/1
150 TABLET ORAL 2 TIMES DAILY
Qty: 60 TABLET | Refills: 1 | Status: SHIPPED | OUTPATIENT
Start: 2025-04-10

## 2025-04-19 PROBLEM — F41.1 GENERALIZED ANXIETY DISORDER: Status: ACTIVE | Noted: 2024-04-11

## 2025-04-19 PROBLEM — F33.1 RECURRENT MAJOR DEPRESSIVE EPISODES, MODERATE (HCC): Status: ACTIVE | Noted: 2024-04-11

## 2025-04-21 ENCOUNTER — TELEPHONE (OUTPATIENT)
Age: 50
End: 2025-04-21

## 2025-04-21 ENCOUNTER — OFFICE VISIT (OUTPATIENT)
Dept: FAMILY MEDICINE CLINIC | Facility: CLINIC | Age: 50
End: 2025-04-21
Payer: COMMERCIAL

## 2025-04-21 VITALS
OXYGEN SATURATION: 98 % | WEIGHT: 247.3 LBS | DIASTOLIC BLOOD PRESSURE: 66 MMHG | BODY MASS INDEX: 37.48 KG/M2 | SYSTOLIC BLOOD PRESSURE: 110 MMHG | HEART RATE: 68 BPM | HEIGHT: 68 IN

## 2025-04-21 DIAGNOSIS — Z12.31 BREAST CANCER SCREENING BY MAMMOGRAM: ICD-10-CM

## 2025-04-21 DIAGNOSIS — F90.8 OTHER SPECIFIED ATTENTION DEFICIT HYPERACTIVITY DISORDER (ADHD): ICD-10-CM

## 2025-04-21 DIAGNOSIS — Z12.11 COLON CANCER SCREENING: ICD-10-CM

## 2025-04-21 DIAGNOSIS — T78.40XS ALLERGY, SEQUELA: ICD-10-CM

## 2025-04-21 DIAGNOSIS — F33.1 RECURRENT MAJOR DEPRESSIVE EPISODES, MODERATE (HCC): ICD-10-CM

## 2025-04-21 DIAGNOSIS — B00.2 HERPES GINGIVOSTOMATITIS: Primary | ICD-10-CM

## 2025-04-21 DIAGNOSIS — F43.10 PTSD (POST-TRAUMATIC STRESS DISORDER): ICD-10-CM

## 2025-04-21 DIAGNOSIS — D51.8 OTHER VITAMIN B12 DEFICIENCY ANEMIA: ICD-10-CM

## 2025-04-21 DIAGNOSIS — E55.9 VITAMIN D INSUFFICIENCY: ICD-10-CM

## 2025-04-21 DIAGNOSIS — F41.1 GENERALIZED ANXIETY DISORDER: ICD-10-CM

## 2025-04-21 DIAGNOSIS — R73.03 PREDIABETES: ICD-10-CM

## 2025-04-21 DIAGNOSIS — E66.9 OBESITY (BMI 30-39.9): ICD-10-CM

## 2025-04-21 PROBLEM — T78.40XA ALLERGIES: Status: ACTIVE | Noted: 2025-04-21

## 2025-04-21 PROBLEM — F90.9 ADHD (ATTENTION DEFICIT HYPERACTIVITY DISORDER): Status: ACTIVE | Noted: 2025-04-21

## 2025-04-21 PROBLEM — B96.89 BACTERIAL VAGINOSIS: Status: RESOLVED | Noted: 2020-01-31 | Resolved: 2025-04-21

## 2025-04-21 PROBLEM — N76.0 BACTERIAL VAGINOSIS: Status: RESOLVED | Noted: 2020-01-31 | Resolved: 2025-04-21

## 2025-04-21 PROCEDURE — 99214 OFFICE O/P EST MOD 30 MIN: CPT | Performed by: FAMILY MEDICINE

## 2025-04-21 RX ORDER — ACYCLOVIR 50 MG/G
OINTMENT TOPICAL
Qty: 30 G | Refills: 0 | Status: SHIPPED | OUTPATIENT
Start: 2025-04-21

## 2025-04-21 RX ORDER — VALACYCLOVIR HYDROCHLORIDE 1 G/1
TABLET, FILM COATED ORAL
Qty: 40 TABLET | Refills: 1 | Status: SHIPPED | OUTPATIENT
Start: 2025-04-21 | End: 2026-04-21

## 2025-04-21 NOTE — TELEPHONE ENCOUNTER
"Behavioral Health Outpatient Intake Questions    Referred By   : PCP    Please advise interviewee that they need to answer all questions truthfully to allow for best care, and any misrepresentations of information may affect their ability to be seen at this clinic   => Was this discussed? Yes     Behavioral Health Outpatient Intake History -     Presenting Problem (in patient's own words): Has ADHD diagnosis    Are there any communication barriers for this patient?     No                                                   Are you taking any psychiatric medications? Yes     If \"YES\" -What are they Wellbutrin and Buspar     If \"YES\" -Who prescribes? Weight management and PCP    Has the Patient previously received outpatient Talk Therapy or Medication Management from [facility]  Yes        If \"YES\"- When, Where and with Whom? Many years ago      Has the Patient abused alcohol or other substances in the last 6 months ? No  No concerns of substance abuse are reported.      Legal History-     Is this treatment court ordered? No       Has the Patient been convicted of a felony?  No      ACCEPTED as a patient Yes  If \"Yes\" Appointment Date: 5/19 at 10am with An[facility] at [facility]    Referred Elsewhere? No      Name of Insurance Co:[employer] [place]  Insurance ID#  Insurance Phone #  If ins is primary or secondary?Primary  If patient is a minor, parents information such as Name, D.O.B of guarantor.  "

## 2025-04-21 NOTE — PROGRESS NOTES
Name: Deepa Wheeler      : 1975      MRN: [id number]  Encounter Provider: Jesus Balbuena DO  Encounter Date: 2025   Encounter department: [facility]  :  Assessment & Plan  Colon cancer screening  recommended  Orders:    Ambulatory referral to Gastroenterology; Future    Breast cancer screening by mammogram  yearly       Herpes gingivostomatitis  Instructed on valtrex use  Orders:    acyclovir (ZOVIRAX) 5 % ointment; Apply to lips and around mouth 5 times per day for 4 days initiate as soon as possible after first signs and symptoms    valACYclovir (VALTREX) 1,000 mg tablet; 2 tabs at earliest onset of cold sore, repeat once in 12 hours    Vitamin D insufficiency  At 25  Increase otc and recheck in future  Orders:    Vitamin D 25 hydroxy; Future    Generalized anxiety disorder  Stable on current meds  F/u  with psychiatry as pt also wants f/u for ADHD       Prediabetes  By A1c at 5.7  Continue TLC  Recheck in 3m  Orders:    Hemoglobin A1C; Future    Other vitamin B12 deficiency anemia  On mvi  recheck        Orders:    Vitamin B12; Future    Recurrent major depressive episodes, moderate (HCC)  Stable but f/u with psychiatry         PTSD (post-traumatic stress disorder)  Psych f/u  Orders:    Ambulatory Referral to Psych Services; Future    Allergy, sequela  Requests to see an allergist  Orders:    Ambulatory Referral to Allergy; Future    Other specified attention deficit hyperactivity disorder (ADHD)  Pscyh f/u       Obesity (BMI 30-39.9)  Wt loss plan per [facility] wt mgmt                  History of Present Illness   HPI  Used to go to [facility]  Change of insurance  Gyn now in [place]  Dr Douglass in the past  Last cscope , due to hemorrhoids, had colon polyps  No fam hx of colon ca    High protein diet  Low calories  Low carb/fat  Going to wt mgmt  They gave her wellbutrin for obesity    Walks for exercise  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 Has enough refills until psych appt    Gets cold sores  Tingling as initial symptoms  Gets infrequently  Using lysine also    Review of Systems   Constitutional:  Negative for chills, fever and unexpected weight change.   Psychiatric/Behavioral:  Positive for dysphoric mood. The patient is nervous/anxious.      Family History   Problem Relation Age of Onset    Brain Aneurysm Mother     Cerebral aneurysm Mother     Breast cancer Maternal Grandmother     Arthritis Maternal Grandmother     Cancer Maternal Grandmother     Brain cancer Family       Social History     Tobacco Use    Smoking status: Former     Current packs/day: 0.00     Average packs/day: 1 pack/day for 27.6 years (27.6 ttl pk-yrs)     Types: Cigarettes     Start date: 10/31/1991     Quit date: 2019     Years since quittin.8    Smokeless tobacco: Never   Vaping Use    Vaping status: Never Used   Substance Use Topics    Alcohol use: Yes     Alcohol/week: 1.0 standard drink of alcohol     Types: 1 Glasses of wine per week     Comment: Occasionally    Drug use: Never      Current Outpatient Medications   Medication Instructions    acyclovir (ZOVIRAX) 5 % ointment Apply to lips and around mouth 5 times per day for 4 days initiate as soon as possible after first signs and symptoms    buPROPion (WELLBUTRIN XL) 150 mg, Oral, 2 times daily    busPIRone (BUSPAR) 15 mg tablet     valACYclovir (VALTREX) 1,000 mg tablet 2 tabs at earliest onset of cold sore, repeat once in 12 hours     >>>>>>>>  Return in about 6 months (around 10/21/2025) for Recheck.  >>>>>>>>    [POCT]  No results found for this or any previous visit (from the past 24 hours).    Visit Vitals  /66 (BP Location: Left arm, Patient Position: Sitting, Cuff Size: Standard)   Pulse 68   Ht 5' 8\" " "(1.727 m)   Wt 112 kg (247 lb 4.8 oz)   SpO2 98%   BMI 37.60 kg/m²   OB Status Ablation   Smoking Status Former   BSA 2.24 m²          Objective   /66 (BP Location: Left arm, Patient Position: Sitting, Cuff Size: Standard)   Pulse 68   Ht 5' 8\" (1.727 m)   Wt 112 kg (247 lb 4.8 oz)   SpO2 98%   BMI 37.60 kg/m²      Physical Exam  Vitals and nursing note reviewed.   Constitutional:       General: She is not in acute distress.     Appearance: She is well-developed. She is obese. She is not ill-appearing.   HENT:      Head: Normocephalic.      Right Ear: Tympanic membrane and ear canal normal.      Left Ear: Tympanic membrane and ear canal normal.      Nose: No congestion.      Mouth/Throat:      Mouth: Mucous membranes are moist.   Eyes:      General: No scleral icterus.     Conjunctiva/sclera: Conjunctivae normal.   Neck:      Vascular: No carotid bruit.   Cardiovascular:      Rate and Rhythm: Normal rate and regular rhythm.      Heart sounds: No murmur heard.  Pulmonary:      Effort: Pulmonary effort is normal. No respiratory distress.      Breath sounds: No wheezing or rales.   Abdominal:      Palpations: Abdomen is soft.      Tenderness: There is no abdominal tenderness.   Musculoskeletal:         General: No deformity.      Cervical back: Neck supple.      Right lower leg: No edema.      Left lower leg: No edema.   Skin:     General: Skin is warm and dry.      Coloration: Skin is not jaundiced or pale.   Neurological:      Mental Status: She is alert.      Motor: No weakness.      Gait: Gait normal.   Psychiatric:         Mood and Affect: Mood normal.         Behavior: Behavior normal.         Thought Content: Thought content normal.       Administrative Statements   I have spent a total time of 45 minutes in caring for this patient on the day of the visit/encounter including Diagnostic results, Risks and benefits of tx options, Instructions for management, Patient and family education, Importance of " tx compliance, Risk factor reductions, Impressions, Counseling / Coordination of care, Documenting in the medical record, Reviewing/placing orders in the medical record (including tests, medications, and/or procedures), and Obtaining or reviewing history  .

## 2025-04-22 NOTE — ASSESSMENT & PLAN NOTE
Instructed on valtrex use  Orders:    acyclovir (ZOVIRAX) 5 % ointment; Apply to lips and around mouth 5 times per day for 4 days initiate as soon as possible after first signs and symptoms    valACYclovir (VALTREX) 1,000 mg tablet; 2 tabs at earliest onset of cold sore, repeat once in 12 hours

## 2025-04-25 ENCOUNTER — TELEPHONE (OUTPATIENT)
Dept: PSYCHIATRY | Facility: CLINIC | Age: 50
End: 2025-04-25

## 2025-04-25 NOTE — TELEPHONE ENCOUNTER
One week follow up call for New Patient appointment with Rose Christian on 05/19/2025 was made on 04/25/2025. Writer informed patient of New Patient paperwork needing to be completed 5 days prior to the appointment. Writer confirmed paperwork has been sent via [employer].    Appointment was made on: 04/21/2025

## 2025-05-19 ENCOUNTER — OFFICE VISIT (OUTPATIENT)
Dept: PSYCHIATRY | Facility: CLINIC | Age: 50
End: 2025-05-19
Payer: COMMERCIAL

## 2025-05-19 DIAGNOSIS — F90.2 ATTENTION DEFICIT HYPERACTIVITY DISORDER (ADHD), COMBINED TYPE: ICD-10-CM

## 2025-05-19 DIAGNOSIS — F33.1 RECURRENT MAJOR DEPRESSIVE EPISODES, MODERATE (HCC): Primary | ICD-10-CM

## 2025-05-19 DIAGNOSIS — F43.10 PTSD (POST-TRAUMATIC STRESS DISORDER): ICD-10-CM

## 2025-05-19 DIAGNOSIS — F41.1 GENERALIZED ANXIETY DISORDER: ICD-10-CM

## 2025-05-19 PROCEDURE — 90792 PSYCH DIAG EVAL W/MED SRVCS: CPT | Performed by: PHYSICIAN ASSISTANT

## 2025-05-19 RX ORDER — MAGNESIUM 30 MG
30 TABLET ORAL 2 TIMES DAILY
COMMUNITY

## 2025-05-19 RX ORDER — YOHIMBE BARK 500 MG
CAPSULE ORAL
COMMUNITY

## 2025-05-19 RX ORDER — OMEGA-3S/DHA/EPA/FISH OIL/D3 300MG-1000
400 CAPSULE ORAL DAILY
COMMUNITY

## 2025-05-19 RX ORDER — LISDEXAMFETAMINE DIMESYLATE 10 MG/1
10 CAPSULE ORAL EVERY MORNING
Qty: 14 CAPSULE | Refills: 0 | Status: SHIPPED | OUTPATIENT
Start: 2025-05-19 | End: 2025-06-02

## 2025-05-19 RX ORDER — BUPROPION HYDROCHLORIDE 300 MG/1
300 TABLET ORAL EVERY MORNING
Qty: 90 TABLET | Refills: 1 | Status: SHIPPED | OUTPATIENT
Start: 2025-05-19 | End: 2025-11-15

## 2025-05-19 RX ORDER — BUSPIRONE HYDROCHLORIDE 15 MG/1
15 TABLET ORAL 3 TIMES DAILY
Qty: 270 TABLET | Refills: 1 | Status: SHIPPED | OUTPATIENT
Start: 2025-05-19 | End: 2025-11-15

## 2025-05-19 NOTE — ASSESSMENT & PLAN NOTE
Not at goal - continue bupropion  mg qAM, buspirone 15 mg TID; on wait list for talk therapy; f/u in 1 month    Orders:    busPIRone (BUSPAR) 15 mg tablet; Take 1 tablet (15 mg total) by mouth 3 (three) times a day

## 2025-05-19 NOTE — ASSESSMENT & PLAN NOTE
Not at goal - continue bupropion  mg qAM, buspirone 15 mg TID; on wait list for talk therapy; f/u in 1 month    Orders:    buPROPion (WELLBUTRIN XL) 300 mg 24 hr tablet; Take 1 tablet (300 mg total) by mouth every morning

## 2025-05-19 NOTE — ASSESSMENT & PLAN NOTE
Not at goal - continue bupropion  mg qAM, buspirone 15 mg TID; on wait list for talk therapy; f/u in 1 month    Orders:    buPROPion (WELLBUTRIN XL) 300 mg 24 hr tablet; Take 1 tablet (300 mg total) by mouth every morning    busPIRone (BUSPAR) 15 mg tablet; Take 1 tablet (15 mg total) by mouth 3 (three) times a day
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
## 2025-05-28 ENCOUNTER — TELEPHONE (OUTPATIENT)
Age: 50
End: 2025-05-28

## 2025-05-28 DIAGNOSIS — F90.2 ATTENTION DEFICIT HYPERACTIVITY DISORDER (ADHD), COMBINED TYPE: ICD-10-CM

## 2025-05-28 RX ORDER — LISDEXAMFETAMINE DIMESYLATE 20 MG/1
20 CAPSULE ORAL EVERY MORNING
Qty: 14 CAPSULE | Refills: 0 | Status: SHIPPED | OUTPATIENT
Start: 2025-05-28 | End: 2025-06-11

## 2025-05-28 NOTE — TELEPHONE ENCOUNTER
Called Deepa [phone] - she has not tried Vyvanse 20 mg in the morning. She would like to try this and have the script sent to the below pharmacy:  [facility] - [place], [place] - [address]

## 2025-05-30 ENCOUNTER — OFFICE VISIT (OUTPATIENT)
Dept: BEHAVIORAL/MENTAL HEALTH CLINIC | Facility: CLINIC | Age: 50
End: 2025-05-30
Payer: COMMERCIAL

## 2025-05-30 DIAGNOSIS — F41.1 GENERALIZED ANXIETY DISORDER: ICD-10-CM

## 2025-05-30 DIAGNOSIS — F33.1 RECURRENT MAJOR DEPRESSIVE EPISODES, MODERATE (HCC): ICD-10-CM

## 2025-05-30 DIAGNOSIS — F43.10 PTSD (POST-TRAUMATIC STRESS DISORDER): ICD-10-CM

## 2025-05-30 DIAGNOSIS — F90.2 ATTENTION DEFICIT HYPERACTIVITY DISORDER (ADHD), COMBINED TYPE: Primary | ICD-10-CM

## 2025-05-30 PROCEDURE — 90791 PSYCH DIAGNOSTIC EVALUATION: CPT
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
## 2025-06-06 ENCOUNTER — OFFICE VISIT (OUTPATIENT)
Dept: BARIATRICS | Facility: CLINIC | Age: 50
End: 2025-06-06
Payer: COMMERCIAL

## 2025-06-06 VITALS
SYSTOLIC BLOOD PRESSURE: 124 MMHG | OXYGEN SATURATION: 96 % | HEIGHT: 68 IN | WEIGHT: 237.6 LBS | DIASTOLIC BLOOD PRESSURE: 70 MMHG | HEART RATE: 81 BPM | BODY MASS INDEX: 36.01 KG/M2

## 2025-06-06 DIAGNOSIS — E66.01 CLASS 2 SEVERE OBESITY DUE TO EXCESS CALORIES WITH SERIOUS COMORBIDITY AND BODY MASS INDEX (BMI) OF 36.0 TO 36.9 IN ADULT (HCC): Primary | ICD-10-CM

## 2025-06-06 DIAGNOSIS — E66.812 CLASS 2 SEVERE OBESITY DUE TO EXCESS CALORIES WITH SERIOUS COMORBIDITY AND BODY MASS INDEX (BMI) OF 36.0 TO 36.9 IN ADULT (HCC): Primary | ICD-10-CM

## 2025-06-06 PROCEDURE — 99214 OFFICE O/P EST MOD 30 MIN: CPT | Performed by: NURSE PRACTITIONER

## 2025-06-06 NOTE — ASSESSMENT & PLAN NOTE
- Patient is pursuing Conservative Program and follow up visits with medical weight management provider  - Initial weight loss goal of 5-10% weight loss for improved health. Weight loss can improve patient's co-morbid conditions and/or prevent weight-related complications.  - Explained the importance of continuing lifestyle changes in addition to any anti-obesity medications.   - Labs reviewed from 12/2024    General Recommendations:  Nutrition:  Eat breakfast daily.  Do not skip meals.      Food log (ie.) www.[employer].com, sparkpeople.com, loseit.com, calorieking.com, etc.     Practice mindful eating.  Be sure to set aside time to eat, eat slowly, and savor your food.     Hydration:    At least 64oz of water daily.  No sugar sweetened beverages.  No juice (eat the fruit instead).     Exercise:  Studies have shown that the ideal exercise goal is somewhere between 150 to 300 minutes of moderate intensity exercise a week.  Start with exercising 10 minutes every other day and gradually increase physical activity with a goal of at least 150 minutes of moderate intensity exercise a week, divided over at least 3 days a week.  An example of this would be exercising 30 minutes a day, 5 days a week.  Resistance training can increase muscle mass and increase our resting metabolic rate.   FULL BODY resistance training is recommended 2-3 times a week.  Do not do this on consecutive days to allow for muscle recovery.     Aim for a bare minimum 5000 steps, even on days you do not exercise.     Monitoring:   Weigh yourself daily.  If this causes undue stress, then just weigh yourself once a week.  Weigh yourself the same time of the day with the same amount of clothing on.  Preferably this should be done after waking up, before you eat, and with no clothing or minimal clothing on.     Specific Goals:  Calorie goal:  9500-7577 marycruz/day  Patient lifestyle habits were reviewed and she was congratulated on her weight loss since  last visit.  Nutrition was discussed and she was encouraged to take out all the information provided by the dietitian at their visits to help get back on track with her meal plan.  She was also given a list of high-protein snacks to bring to work with her to avoid going to snacks while at work.  Activity was discussed and she was encouraged to continue with her more active lifestyle and to incorporate some formal exercise whether it is in the form of yoga and stretching or walks with her granddaughter.  Medications were discussed and patient will continue on bupropion 300 mg daily with her psychiatrist.  She will also evaluate her cravings and control since starting Vyvanse a couple of weeks ago.  If she still struggling with control may consider the addition of naltrexone in the future.    Patient will return in 3 months for accountability and to further evaluate her weight loss plan.

## 2025-06-06 NOTE — PROGRESS NOTES
Assessment/Plan:     Class 2 severe obesity due to excess calories with serious comorbidity and body mass index (BMI) of 36.0 to 36.9 in adult (HCC)  - Patient is pursuing Conservative Program and follow up visits with medical weight management provider  - Initial weight loss goal of 5-10% weight loss for improved health. Weight loss can improve patient's co-morbid conditions and/or prevent weight-related complications.  - Explained the importance of continuing lifestyle changes in addition to any anti-obesity medications.   - Labs reviewed from 12/2024    General Recommendations:  Nutrition:  Eat breakfast daily.  Do not skip meals.      Food log (ie.) www.[employer].com, sparkpeople.com, loseit.com, [employer].com, etc.     Practice mindful eating.  Be sure to set aside time to eat, eat slowly, and savor your food.     Hydration:    At least 64oz of water daily.  No sugar sweetened beverages.  No juice (eat the fruit instead).     Exercise:  Studies have shown that the ideal exercise goal is somewhere between 150 to 300 minutes of moderate intensity exercise a week.  Start with exercising 10 minutes every other day and gradually increase physical activity with a goal of at least 150 minutes of moderate intensity exercise a week, divided over at least 3 days a week.  An example of this would be exercising 30 minutes a day, 5 days a week.  Resistance training can increase muscle mass and increase our resting metabolic rate.   FULL BODY resistance training is recommended 2-3 times a week.  Do not do this on consecutive days to allow for muscle recovery.     Aim for a bare minimum 5000 steps, even on days you do not exercise.     Monitoring:   Weigh yourself daily.  If this causes undue stress, then just weigh yourself once a week.  Weigh yourself the same time of the day with the same amount of clothing on.  Preferably this should be done after waking up, before you eat, and with no clothing or minimal clothing  on.     Specific Goals:  Calorie goal:  6299-1730 marycruz/day  Patient lifestyle habits were reviewed and she was congratulated on her weight loss since last visit.  Nutrition was discussed and she was encouraged to take out all the information provided by the dietitian at their visits to help get back on track with her meal plan.  She was also given a list of high-protein snacks to bring to work with her to avoid going to snacks while at work.  Activity was discussed and she was encouraged to continue with her more active lifestyle and to incorporate some formal exercise whether it is in the form of yoga and stretching or walks with her granddaughter.  Medications were discussed and patient will continue on bupropion 300 mg daily with her psychiatrist.  She will also evaluate her cravings and control since starting Vyvanse a couple of weeks ago.  If she still struggling with control may consider the addition of naltrexone in the future.    Patient will return in 3 months for accountability and to further evaluate her weight loss plan.         Diagnoses and all orders for this visit:    Class 2 severe obesity due to excess calories with serious comorbidity and body mass index (BMI) of 36.0 to 36.9 in adult (HCC)        Total time spent reviewing chart, interviewing patient, examining patient, discussing plan, answering all questions, and documentin minutes with >50% face-to-face time with the patient.    Follow up in approximately 3 months with Non-Surgical Physician/Advanced Practitioner.    Subjective:   No chief complaint on file.      Patient ID: Deepa Wheeler  is a 50 y.o. female with excess weight/obesity here to pursue weight management.  Patient is pursuing Conservative Program.   Most recent notes and records were reviewed.    HPI    Wt Readings from Last 20 Encounters:   25 108 kg (237 lb 9.6 oz)   25 112 kg (247 lb 4.8 oz)   25 111 kg (245 lb)   25 111 kg (244 lb 9.6 oz)   24  111 kg (244 lb 6.4 oz)   12/17/24 111 kg (245 lb)   12/17/24 110 kg (242 lb 6.4 oz)   11/21/24 112 kg (247 lb 12.8 oz)   11/20/24 114 kg (251 lb 9.6 oz)   10/22/24 112 kg (246 lb 12.8 oz)   10/10/24 113 kg (248 lb 12.8 oz)   09/23/24 112 kg (247 lb 3.2 oz)   09/16/24 112 kg (247 lb)   08/15/24 112 kg (247 lb)   08/15/24 113 kg (248 lb 6.4 oz)   08/10/24 112 kg (246 lb 6.4 oz)   06/03/20 114 kg (252 lb 3.2 oz)   05/20/20 114 kg (250 lb 9.6 oz)   02/27/20 109 kg (239 lb 9.6 oz)   01/31/20 108 kg (239 lb)       Patient presents today to medical weight management office for follow up.  At last office visit patient was started on bupropion and is currently at 300 mg daily.  Her psychiatrist has taken over care and has also started her on Vyvanse to treat her ADHD which may also improve binge eating disorder tendencies.  Patient feels that her behaviors are getting better controlled but she still suffers with stress and boredom eating, mostly at work.  Patient has been watching her 2-year-old granddaughter and has been more active.  She feels her close are all looser and she has noticed a loss of inches.  She is feeling good about her weight loss progress and wishes to continue.    Weight loss medication and dose: Bupropion 300 mg  Started weight and date: 242.4 lbs in 12/2024  Current weight: 237.6 lbs   Difference: -4.8 lbs   Percentage of weight loss: -1.9%  Goal weight: 190 lbs    Starting BMI: 36.86 in 12/2024  Current BMI: 36.13    Waist Measurements:  12/2024: 45.5 in  6/2025: 42.25 in    MWM Weights:  12/2024: 242.4 lbs *started on bupropion  6/2025: 237.6 lbs (-4.8 lbs)    Nutrition Prescription  Calories: 6304-0881 kcal  Protein: 85-95 g  Fluid: 2200 ml      Diet recall:  Breakfast: egg sandwich with egg, cheese, American no, english muffin    9-9:30am:  At work 2-3oz of chicken, broccoli, spinach, 4 eggs, occ cheese--will eat 1/2 one day and 1/2 another son eating 2 eggs at a time OR bagel thin with avocado  "or veggie cream cheese light   Snack: 1/8 cup of 3 different nuts--picks at this, may eat it all and other times may not finish it OR banana or small orange  Lunch: 1pm or 2-3 pm:  salad with lettuce, cucumber, tomato,1/2 avocado, turkey/chicken/andrea short cuts/steak (1lb split into 3 portions) (not weighing yet) OR low carb garlic and herb wrap--1-2 slices turkey or deli buffalo chicken breast, avocado, lettuce  Snack --  Dinner: 6pm-one night had fried chicken and mac & cheese OR skipping OR just having orange if has a later dinner OR salad OR grilled chicken tenders, brussels sprouts, rice (2 serving spoon size scoops)  Snack:  1 yasso bar + 1/2 sliced frozen banana OR oranges OR when granddaughter was visiting had some ice cream.   Take out frequency: 3-4 times a week (chipotle)      Hydration: water, coffee, black tea and matcha  Alcohol: no  Smoking: no  Exercise: walks daily  Occupation: desk job  Sleep: will be starting CPAP soon      The following portions of the patient's history were reviewed and updated as appropriate: allergies, current medications, past family history, past medical history, past social history, past surgical history, and problem list.    Family History[1]     Review of Systems   Constitutional:  Negative for fatigue.   HENT:  Negative for sore throat.    Respiratory:  Negative for cough and shortness of breath.    Cardiovascular:  Negative for chest pain, palpitations and leg swelling.   Gastrointestinal:  Negative for abdominal pain, constipation, diarrhea and nausea.   Genitourinary:  Negative for dysuria.   Musculoskeletal:  Negative for arthralgias and back pain.   Skin:  Negative for rash.   Neurological:  Negative for headaches.   Psychiatric/Behavioral:  Negative for dysphoric mood. The patient is not nervous/anxious.        Objective:  /70 (BP Location: Left arm, Patient Position: Sitting, Cuff Size: Large)   Pulse 81   Ht 5' 8\" (1.727 m)   Wt 108 kg (237 lb 9.6 oz)  "  SpO2 96%   BMI 36.13 kg/m²     Physical Exam  Vitals and nursing note reviewed.   Constitutional:       Appearance: Normal appearance. She is obese.   HENT:      Head: Normocephalic.   Pulmonary:      Effort: Pulmonary effort is normal.     Neurological:      General: No focal deficit present.      Mental Status: She is alert and oriented to person, place, and time.     Psychiatric:         Mood and Affect: Mood normal.         Behavior: Behavior normal.         Thought Content: Thought content normal.         Judgment: Judgment normal.            Labs   Most recent labs reviewed   Lab Results   Component Value Date    SODIUM 141 12/21/2024    K 4.3 12/21/2024     12/21/2024    CO2 24 12/21/2024    BUN 13 12/21/2024    CREATININE 0.80 12/21/2024    GLUC 94 12/21/2024    AST 13 12/21/2024    ALT 15 12/21/2024    TP 7.0 12/21/2024    TBILI 0.3 12/21/2024    EGFR 89 12/21/2024     Lab Results   Component Value Date    HGBA1C 5.7 (H) 12/21/2024     Lab Results   Component Value Date    ZCI4YJJDGCMF 1.020 03/05/2016    TSH 0.812 12/21/2024     Lab Results   Component Value Date    CHOLESTEROL 198 12/21/2024     Lab Results   Component Value Date    HDL 52 12/21/2024     Lab Results   Component Value Date    TRIG 71 12/21/2024     Lab Results   Component Value Date    LDLCALC 133 (H) 12/21/2024          [1]   Family History  Problem Relation Name Age of Onset    Brain Aneurysm Mother      Cerebral aneurysm Mother      Breast cancer Maternal Grandmother Dina Wheeler     Arthritis Maternal Grandmother Dina Wheeler     Cancer Maternal Grandmother Dina Wheeler     Brain cancer Family

## 2025-06-09 ENCOUNTER — SOCIAL WORK (OUTPATIENT)
Dept: BEHAVIORAL/MENTAL HEALTH CLINIC | Facility: CLINIC | Age: 50
End: 2025-06-09
Payer: COMMERCIAL

## 2025-06-09 DIAGNOSIS — F90.0 ATTENTION DEFICIT HYPERACTIVITY DISORDER (ADHD), PREDOMINANTLY INATTENTIVE TYPE: ICD-10-CM

## 2025-06-09 DIAGNOSIS — F33.1 RECURRENT MAJOR DEPRESSIVE EPISODES, MODERATE (HCC): Primary | ICD-10-CM

## 2025-06-09 DIAGNOSIS — F43.10 PTSD (POST-TRAUMATIC STRESS DISORDER): ICD-10-CM

## 2025-06-09 DIAGNOSIS — F41.1 GENERALIZED ANXIETY DISORDER: ICD-10-CM

## 2025-06-09 PROCEDURE — 90834 PSYTX W PT 45 MINUTES: CPT

## 2025-06-09 NOTE — BH CRISIS PLAN
Client Name: Deepa Wheeler       Client YOB: 1975    Sherice Safety Plan         Step 1: Warning Signs:   When I stop cleaning, when things are messy         Step 2: Internal Coping Strategies:   Breathing exercises, lay down to decompress/grounding         Step 3: People and social settings that provide distraction:       Walking at the park     Step 4: People whom I can ask for help during a crisis:    My oldest dtr or sister  Step 5: Professionals or agencies I can contact during a crisis:   [phone]     Crisis Phone Numbers:   Suicide Prevention Lifeline: Call or Text  981 Crisis Text Line: Text HOME to 734-168   Please note: Some [facility] do not have a separate number for Child/Adolescent specific crisis. If your county is not listed under Child/Adolescent, please call the adult number for your county      Adult Crisis Numbers: Child/Adolescent Crisis Numbers   [facility]: [phone] [facility]: [phone]   [facility]: [phone] [facility]: [phone]   [facility]: [phone] [place]: [phone]   [facility]: [phone] [facility]: [phone]   [facility]: [phone]   [facility]: [phone]   [facility]: [phone]   [facility]: [phone] (daytime) [phone] (after hours, weekends, holidays)      Step 6: Making the environment safer (plan for lethal means safety):    No lethal weapons  Optional: What is most important to me and worth living for?    My children, my grandchildren  Sherice Safety Plan. Estela Betancourt and Steve Bull. Used with permission of the authors.

## 2025-06-09 NOTE — BH TREATMENT PLAN
"Outpatient Behavioral Health Psychotherapy Treatment Plan    Deepa Wheeler  1975     Date of Initial Psychotherapy Assessment: 5/30/25   Date of Current Treatment Plan: 06/09/25  Treatment Plan Target Date: 12/9/25   Treatment Plan Expiration Date: 12/9/25    Diagnosis:   1. Recurrent major depressive episodes, moderate (HCC)        2. PTSD (post-traumatic stress disorder)        3. Generalized anxiety disorder        4. Attention deficit hyperactivity disorder (ADHD), predominantly inattentive type            Area(s) of Need: focusing on work, accomplishing tasks, struggle with depression, anxiety, PTSD    Long Term Goal 1 (in the client's own words): \"I want to find want ways to manage and cope with my symptoms related to ADHD and focus\"  Stage of Change: Preparation    Target Date for completion: 12/9/25     Anticipated therapeutic modalities: CBT, solution-focused     People identified to complete this goal: Ananya Arenas      Objective 1: (identify the means of measuring success in meeting the objective): Deepa will identify 3 tasks that are helpful in managing her lack of attention and focus      Objective 2: (identify the means of measuring success in meeting the objective): Deepa will meet every 3 months with medication management provider and be compliant with psychiatric medications      Long Term Goal 2 (in the client's own words): \"Address depression, PTSD, learn to cope with these in a healthy manner\"    Stage of Change: Preparation    Target Date for completion: 12/6/25     Anticipated therapeutic modalities: CBT, psychodynamic     People identified to complete this goal: Deepa Hare      Objective 1: (identify the means of measuring success in meeting the objective): Deepa will utilize 2 learned tasks on a regular basis, from biweekly sessions in between session      Objective 2: (identify the means of measuring success in meeting the objective): Deepa's depression and/or anxiety will decrease " "to a 5 from a 10      I am currently under the care of a [facility] psychiatric provider: yes    My [facility] psychiatric provider is: Rose Christian    I am currently taking psychiatric medications: Yes, as prescribed    I feel that I will be ready for discharge from mental health care when I reach the following (measurable goal/objective): \"When I notice that the negative self talk lessened or gone\"    For children and adults who have a legal guardian:   Has there been any change to custody orders and/or guardianship status? N/A. If yes, attach updated documentation.    I have created my Crisis Plan and have been offered a copy of this plan    Behavioral Health Treatment Plan [facility]: Diagnosis and Treatment Plan explained to Deepa Wheeler acknowledges an understanding of their diagnosis. Deepa Wheeler agrees to this treatment plan.    I have been offered a copy of this Treatment Plan. yes        "

## 2025-06-09 NOTE — PSYCH
Behavioral Health Psychotherapy Progress Note    Psychotherapy Provided: Individual Psychotherapy     1. Recurrent major depressive episodes, moderate (HCC)        2. PTSD (post-traumatic stress disorder)        3. Generalized anxiety disorder        4. Attention deficit hyperactivity disorder (ADHD), predominantly inattentive type            Goals addressed in session: Goal 1 and Goal 2     DATA: Writer and ct Deepa reviewed initial session disucssion, discussed what ct would like to do for treatment plan goals and objectives. Ct would liek to focus on tasks to help manage ADHD symptoms, as well as work on coping skills to address symptoms related to depression, anxiety and PTSD. Ct requested change in schedule to come in later than 8am, which writer scheduled. Writer offered support to ct for choosing these goals to work towards, noting that treatment plan is a guide for ct and goals can be added if ct wishes.   Writer provided some tips for ct to address difficulty focusing, such as, using sticky notes as reminders; focusing on one task at a time; slowing down in order to remain focused; limiting external distractions; maintaining a routine and schedule. Ct used notepad during session to note these.   During this session, this clinician used the following therapeutic modalities: Engagement Strategies, Client-centered Therapy, Solution-Focused Therapy, and Supportive Psychotherapy    Substance Abuse was not addressed during this session. If the client is diagnosed with a co-occurring substance use disorder, please indicate any changes in the frequency or amount of use: . Stage of change for addressing substance use diagnoses: No substance use/Not applicable    ASSESSMENT:  Deepa Wheeler presents with a Euthymic/ normal mood.     her affect is Normal range and intensity, which is congruent, with her mood and the content of the session. The client has not made progress on their goals as they were just developed  "today     Deepa Wheeler presents with a none risk of suicide, none risk of self-harm, and none risk of harm to others.    For any risk assessment that surpasses a \"low\" rating, a safety plan must be developed.    A safety plan was indicated: no  If yes, describe in detail       PLAN: Between sessions, Deepa Wheeler will practice tasks related to helping increase attention and focus. At the next session, the therapist will use Engagement Strategies, Client-centered Therapy, and Supportive Psychotherapy to address emotional difficulties, trouble with attention and focus.    Behavioral Health Treatment Plan and Discharge Planning: Deepa Wheeler is aware of and agrees to continue to work on their treatment plan. They have identified and are working toward their discharge goals. yes    Depression Follow-up Plan Completed: No    Visit start and stop times:    06/09/25  Start Time: 0800  Stop Time: 0850  Total Visit Time: 50 minutes  "

## 2025-06-11 ENCOUNTER — TELEPHONE (OUTPATIENT)
Age: 50
End: 2025-06-11

## 2025-06-11 NOTE — TELEPHONE ENCOUNTER
Deepa Wheelre contacted the office for Rose Christian with a medication concern.  Name of Medication Vyvanse 20 mg.     Concerns/ Side effects: Pt states she is supposed to send a message to Rose regarding how the medication is working for her.    Pt is asking if she can go up to 30 mg or 40 mg with the Vyvanse. It is helping some but not enough.    She did 10 mg for two weeks and now 20 mg for a week and a half.

## 2025-06-12 DIAGNOSIS — F90.2 ATTENTION DEFICIT HYPERACTIVITY DISORDER (ADHD), COMBINED TYPE: ICD-10-CM

## 2025-06-12 RX ORDER — LISDEXAMFETAMINE DIMESYLATE 30 MG/1
30 CAPSULE ORAL EVERY MORNING
Qty: 14 CAPSULE | Refills: 0 | Status: SHIPPED | OUTPATIENT
Start: 2025-06-12 | End: 2025-06-26

## 2025-06-13 NOTE — TELEPHONE ENCOUNTER
Called Deepa ([phone]) and left  requesting a call back. Upon return call will advise that a script for 30 mg daily in the morning was sent for 14 days. At around day 10 she should call the office with an update on how that is working for her.

## 2025-06-13 NOTE — TELEPHONE ENCOUNTER
Patient returned call.     Writer relayed nurses msg.     Patient acknowledged and did not have further questions.

## 2025-06-16 ENCOUNTER — OFFICE VISIT (OUTPATIENT)
Dept: PSYCHIATRY | Facility: CLINIC | Age: 50
End: 2025-06-16
Payer: COMMERCIAL

## 2025-06-16 DIAGNOSIS — F90.2 ATTENTION DEFICIT HYPERACTIVITY DISORDER (ADHD), COMBINED TYPE: ICD-10-CM

## 2025-06-16 DIAGNOSIS — F33.1 RECURRENT MAJOR DEPRESSIVE EPISODES, MODERATE (HCC): Primary | ICD-10-CM

## 2025-06-16 DIAGNOSIS — F41.1 GENERALIZED ANXIETY DISORDER: ICD-10-CM

## 2025-06-16 DIAGNOSIS — F43.10 PTSD (POST-TRAUMATIC STRESS DISORDER): ICD-10-CM

## 2025-06-16 PROCEDURE — 99214 OFFICE O/P EST MOD 30 MIN: CPT | Performed by: PHYSICIAN ASSISTANT

## 2025-06-16 NOTE — PSYCH
MEDICATION MANAGEMENT NOTE    Name: Deepa Wheeler      : 1975      MRN: [id number]  Encounter Provider: Rose Christian  Encounter Date: 2025   Encounter department: [facility]    Insurance: Payor: [employer] [place] / Plan: [facility] / Product Type: Blue Fee for Service /      Reason for Visit:   Chief Complaint   Patient presents with    Follow-up    Medication Management    Depression    Anxiety    ADHD   :  Assessment & Plan  Recurrent major depressive episodes, moderate (HCC)  Not at goal - continue bupropion  mg qAM, buspirone 15 mg TID; continue talk therapy; f/u in 1 month         Generalized anxiety disorder  Not at goal - continue bupropion  mg qAM, buspirone 15 mg TID; continue talk therapy; f/u in 1 month        PTSD (post-traumatic stress disorder)  Not at goal - continue bupropion  mg qAM, buspirone 15 mg TID; continue talk therapy; f/u in 1 month        Attention deficit hyperactivity disorder (ADHD), combined type  Not at goal - continue Vyvanse 30 mg qAM; f/u in 1 month           So far she is tolerating Vyvanse well and is up to 30 mg qAM. She feels there is some benefit but just increased to 30 mg yesterday. Advised sticking with this dose unchanged for at least 2 weeks, then updating me. Option to increase to 40 mg qAM. No other med changes advised.     Treatment Recommendations:    Continue current medications:     - bupropion  mg qAM     - buspirone 15 mg TID     - Vyvanse 30 mg qAM    Educated about diagnosis and treatment modalities. Verbalizes understanding and agreement with the treatment plan.  Discussed self monitoring of symptoms, and symptom monitoring tools.  Discussed medications and if treatment adjustment was needed or desired.  Medication management every 1 month  Aware of 24 hour and weekend coverage for urgent situations accessed by calling [facility] main practice  "number  Continue psychotherapy with [facility] therapist Nany Vanegas  I am scheduling this patient out for greater than 3 months: No    Medications Risks/Benefits:      Risks, Benefits And Possible Side Effects Of Medications:    Risks, benefits, and possible side effects of medications explained to Deepa and she (or legal representative) verbalizes understanding and agreement for treatment.    Controlled Medication Discussion:     Deepa has been filling controlled prescriptions on time as prescribed according to [place] Prescription Drug Monitoring Program.  Discussed with Deepa the risks of impairment of ability to drive and potential for abuse and addiction related to treatment with stimulant medications. She understands risk of treatment with stimulant medications, agrees to not drive if feels impaired and agrees to take medications as prescribed.  Deepa is using medication appropriately.  Controlled substance contract was discussed with Deepa and signed on 06/16/25.      History of Present Illness     Deepa is seen today for a follow up for Follow-up, Medication Management, Depression, Anxiety, and ADHD and PTSD. Since her last visit she did try Vyvanse and so far is tolerating it well. At first she had some headaches, but they resolved. She is slightly more irritable lately but states this may be due to the stress of taking care of a 1 y/o. She is still struggling with focus and task completion at work but at home she was able to finish one of the five tasks started in a day, which normally she would finish none.     Past medication trials:  Prozac (weight gain), trazodone, Abilify (weight gain), Lamictal, Valium, Edluar (\"drugged feeling\")     She denies any suicidal ideation, intent or plan at present; denies any homicidal ideation, intent or plan at present.    She denies any auditory hallucinations, denies any visual hallucinations, denies any delusions.    She denies any side effects from " current psychiatric medications.    HPI ROS Appetite Changes and Sleep:     She reports normal sleep, normal appetite, normal energy level    Review Of Systems: A review of systems is obtained and is negative except for the pertinent positives listed in HPI/Subjective above.      Current Rating Scores:     None completed today.    Areas of Improvement: reviewed in HPI/Subjective Section and reviewed in Assessment and Plan Section      Past Medical History[1]  Past Surgical History[2]  Allergies: Allergies[3]    Current Outpatient Medications   Medication Instructions    acyclovir (ZOVIRAX) 5 % ointment Apply to lips and around mouth 5 times per day for 4 days initiate as soon as possible after first signs and symptoms    Ashwagandha 120 MG CAPS Take by mouth    buPROPion (WELLBUTRIN XL) 300 mg, Oral, Every morning    busPIRone (BUSPAR) 15 mg, Oral, 3 times daily    cholecalciferol (VITAMIN D3) 400 Units, Daily    Lactobacillus (Acidophilus) 100 MG CAPS Take by mouth    lisdexamfetamine (VYVANSE) 30 mg, Oral, Every morning    LYSINE PO Take by mouth    magnesium 325 mg, Daily    Omega-3 Fatty Acids (OMEGA 3 500 PO) Take by mouth    valACYclovir (VALTREX) 1,000 mg tablet 2 tabs at earliest onset of cold sore, repeat once in 12 hours        Substance Abuse History:    Tobacco, Alcohol and Drug Use History     Tobacco Use    Smoking status: Former     Current packs/day: 0.00     Average packs/day: 1 pack/day for 27.6 years (27.6 ttl pk-yrs)     Types: Cigarettes     Start date: 10/31/1991     Quit date: 2019     Years since quittin.9    Smokeless tobacco: Never   Vaping Use    Vaping status: Never Used   Substance Use Topics    Alcohol use: Yes     Alcohol/week: 1.0 standard drink of alcohol     Types: 1 Glasses of wine per week     Comment: Occasionally    Drug use: Never          Social History:    Social History     Socioeconomic History    Marital status: Single     Spouse name: Not on file    Number of  children: Not on file    Years of education: Not on file    Highest education level: Not on file   Occupational History    Not on file   Other Topics Concern    Not on file   Social History Narrative    Not on file        Family Psychiatric History:     Family History[4]    Medical History Reviewed by provider this encounter:  Tobacco  Allergies  Meds  Problems  Med Hx  Surg Hx  Fam Hx          Objective   There were no vitals taken for this visit.     Mental Status Evaluation:    Appearance age appropriate, casually dressed   Behavior cooperative, calm   Speech normal rate, normal volume, normal pitch, spontaneous   Mood euthymic   Affect normal range and intensity, appropriate   Thought Processes organized, goal directed   Thought Content no overt delusions   Perceptual Disturbances: no auditory hallucinations, no visual hallucinations   Abnormal Thoughts  Risk Potential Suicidal ideation - None  Homicidal ideation - None  Potential for aggression - No   Orientation oriented to person, place, time/date, and situation   Memory recent and remote memory grossly intact   Consciousness alert and awake   Attention Span Concentration Span attention span and concentration appear shorter than expected for age   Intellect appears to be of average intelligence   Insight intact   Judgement intact   Muscle Strength and  Gait normal muscle strength and normal muscle tone, normal gait and normal balance   Motor activity no abnormal movements   Language no difficulty naming common objects, no difficulty repeating a phrase, no difficulty writing a sentence   Fund of Knowledge adequate knowledge of current events  adequate fund of knowledge regarding past history  adequate fund of knowledge regarding vocabulary        Laboratory Results: I have personally reviewed all pertinent laboratory/tests results    Suicide/Homicide Risk Assessment:    Risk of Harm to Self:  The following ratings are based on assessment at the time of  "the interview  Based on today's assessment, Deepa presents the following risk of harm to self: none    Risk of Harm to Others:  The following ratings are based on assessment at the time of the interview  Based on today's assessment, Deepa presents the following risk of harm to others: none    The following interventions are recommended: Continue medication management. No other intervention changes indicated at this time.    Psychotherapy Provided:     Individual psychotherapy provided: No    Treatment Plan:    Completed and signed during the session: Not applicable - Treatment Plan to be completed by [facility] therapist.    Goals: Progress towards Treatment Plan goals - Yes, progressing, as evidenced by subjective findings in HPI/Subjective Section and in Assessment and Plan Section    Depression Follow-up Plan Completed: Not applicable    Note Share:    This note was shared with patient.    Visit Time  Visit Start Time: 4:30 PM  Visit Stop Time: 4:45 PM  Total Visit Duration: 15 minutes    Portions of the record may have been created with voice recognition software. Occasional wrong word or \"sound a like\" substitutions may have occurred due to the inherent limitations of voice recognition software. Read the chart carefully and recognize, using context, where substitutions have occurred.    Rose Christian 06/16/25       [1]   Past Medical History:  Diagnosis Date    Abnormal Pap smear of cervix 2015    Allergic 1989    Anxiety 1990    Arthritis 2024    Blepharitis     Breast lump     last assessed: 3/18/2014    Depression 2011    EBV seropositivity     last assessed: 3/18/2016    Herpes simplex infection     last assessed: 8/19/2014    Hirsutism     HPV (human papilloma virus) infection 2015    Malaise and fatigue     last assessed: 3/18/2016    Multiple joint pain     last assessed: 3/1/2016    Myalgia     last assessed: 3/1/2016    Trauma     Since childhood   [2]   Past Surgical " History:  Procedure Laterality Date    BREAST BIOPSY  2010    Benign    BREAST FIBROADENOMA SURGERY      COLONOSCOPY  2004    COLPOSCOPY  2003 ??    Hemorrhoids    HYSTEROSCOPY W/ ENDOMETRIAL ABLATION      TOOTH EXTRACTION      TUBAL LIGATION     [3]   Allergies  Allergen Reactions    Acetaminophen Other (See Comments)    Hydrocodone Other (See Comments)    Hydrocodone-Acetaminophen Abdominal Pain    Penicillins Itching   [4]   Family History  Problem Relation Name Age of Onset    Brain Aneurysm Mother      Cerebral aneurysm Mother      Breast cancer Maternal Grandmother Dina Liam     Arthritis Maternal Grandmother Dina Liam     Cancer Maternal Grandmother Dina Wheeler     Brain cancer Family

## 2025-06-16 NOTE — ASSESSMENT & PLAN NOTE
Not at goal - continue bupropion  mg qAM, buspirone 15 mg TID; continue talk therapy; f/u in 1 month

## 2025-06-20 ENCOUNTER — TELEPHONE (OUTPATIENT)
Age: 50
End: 2025-06-20

## 2025-06-20 NOTE — TELEPHONE ENCOUNTER
Patient is calling regarding cancelling an appointment.    Date/Time: 6.20.25 @ 11:00AM    Reason: work    Patient was rescheduled: YES [] NO [x]  Patient has a follow up appointment on: 6.30.25 @ 10:00am    -The provider was made aware via secure chat, and the clerical department was updated

## 2025-06-24 ENCOUNTER — TELEPHONE (OUTPATIENT)
Age: 50
End: 2025-06-24

## 2025-06-24 DIAGNOSIS — F90.2 ATTENTION DEFICIT HYPERACTIVITY DISORDER (ADHD), COMBINED TYPE: ICD-10-CM

## 2025-06-24 RX ORDER — LISDEXAMFETAMINE DIMESYLATE 40 MG/1
40 CAPSULE ORAL EVERY MORNING
Qty: 14 CAPSULE | Refills: 0 | Status: SHIPPED | OUTPATIENT
Start: 2025-06-24 | End: 2025-07-08

## 2025-06-24 NOTE — TELEPHONE ENCOUNTER
Patient called in and shared provider is upping medication Vyvanse every 2 weeks and is time to up it again.     Writer informed patient msg will be relay.

## 2025-06-30 ENCOUNTER — SOCIAL WORK (OUTPATIENT)
Dept: BEHAVIORAL/MENTAL HEALTH CLINIC | Facility: CLINIC | Age: 50
End: 2025-06-30
Payer: COMMERCIAL

## 2025-06-30 DIAGNOSIS — F90.0 ATTENTION DEFICIT HYPERACTIVITY DISORDER (ADHD), PREDOMINANTLY INATTENTIVE TYPE: Primary | ICD-10-CM

## 2025-06-30 DIAGNOSIS — F41.1 GENERALIZED ANXIETY DISORDER: ICD-10-CM

## 2025-06-30 DIAGNOSIS — F43.10 PTSD (POST-TRAUMATIC STRESS DISORDER): ICD-10-CM

## 2025-06-30 DIAGNOSIS — F33.1 RECURRENT MAJOR DEPRESSIVE EPISODES, MODERATE (HCC): ICD-10-CM

## 2025-06-30 PROCEDURE — 90834 PSYTX W PT 45 MINUTES: CPT

## 2025-06-30 NOTE — PSYCH
Behavioral Health Psychotherapy Progress Note    Psychotherapy Provided: Individual Psychotherapy     1. Attention deficit hyperactivity disorder (ADHD), predominantly inattentive type        2. Generalized anxiety disorder        3. PTSD (post-traumatic stress disorder)        4. Recurrent major depressive episodes, moderate (HCC)            Goals addressed in session: Goal 1     DATA:Deepa states her PA increased Vyvanse, talked about side effects. Touched on how medications are helpful in addition to therapy in treating ADHD symptoms, inquired about what ct does to help manage symptoms of ADHD, such as keeping a notebook to write down reminders.   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 Utilized CBT to assist ct in managing fears, explored ct's fears and feelings of powerlessness. Identified rational thougths to help remain grounded. Reports feeling so fearful ct felt like dinking. Main fear is dtr being captured. Reviewed safety that his available, in [place] [facility] [place],  for dtr and [place] deployed there, which then minimized ct fears after discussion.   Mentioned her work stres, annoyance with coworker, what she likes about job and helping others. Provided supportive feedback and encouraged ct to continue to share what she struggles with, writer appreciative.    During this session, this clinician used the following therapeutic modalities: Engagement Strategies, Client-centered Therapy, Cognitive Behavioral Therapy, and Supportive Psychotherapy    Substance Abuse was not addressed during this session. If the client is diagnosed with a co-occurring substance use disorder, please indicate any changes in the frequency or amount of use:   . Stage of change for addressing substance use diagnoses: No substance use/Not applicable    ASSESSMENT:  Deepa Wheeler presents with a Anxious mood.     her affect is Constricted, which is congruent, with her mood and the content of the  "session. The client has made progress on their goals. Is practicing tools between sessions to identify triggers that lead to anxiety. Is following medication protocol with provider       Deepa Wheeler presents with a none risk of suicide, none risk of self-harm, and none risk of harm to others.    For any risk assessment that surpasses a \"low\" rating, a safety plan must be developed.    A safety plan was indicated: no  If yes, describe in detail       PLAN: Between sessions, Deepa Wheeler will identify triggers that lead to anxiety. At the next session, the therapist will use Client-centered Therapy, Cognitive Behavioral Therapy, Solution-Focused Therapy, and Supportive Psychotherapy to address anxiety.    Behavioral Health Treatment Plan and Discharge Planning: Deepa Wheeler is aware of and agrees to continue to work on their treatment plan. They have identified and are working toward their discharge goals. yes    Depression Follow-up Plan Completed: No    Visit start and stop times:    06/30/25  Start Time: 1000  Stop Time: 1047  Total Visit Time: 47 minutes  "

## 2025-07-03 ENCOUNTER — OFFICE VISIT (OUTPATIENT)
Dept: FAMILY MEDICINE CLINIC | Facility: CLINIC | Age: 50
End: 2025-07-03
Payer: COMMERCIAL

## 2025-07-03 VITALS
DIASTOLIC BLOOD PRESSURE: 78 MMHG | HEART RATE: 102 BPM | HEIGHT: 68 IN | TEMPERATURE: 97 F | RESPIRATION RATE: 18 BRPM | SYSTOLIC BLOOD PRESSURE: 120 MMHG | WEIGHT: 237 LBS | BODY MASS INDEX: 35.92 KG/M2 | OXYGEN SATURATION: 98 %

## 2025-07-03 DIAGNOSIS — M62.838 MUSCLE SPASM: ICD-10-CM

## 2025-07-03 DIAGNOSIS — F32.2 SEVERE MAJOR DEPRESSIVE DISORDER (HCC): ICD-10-CM

## 2025-07-03 DIAGNOSIS — M54.9 BACK PAIN, UNSPECIFIED BACK LOCATION, UNSPECIFIED BACK PAIN LATERALITY, UNSPECIFIED CHRONICITY: Primary | ICD-10-CM

## 2025-07-03 PROCEDURE — 99214 OFFICE O/P EST MOD 30 MIN: CPT | Performed by: FAMILY MEDICINE

## 2025-07-03 RX ORDER — BACLOFEN 10 MG/1
10 TABLET ORAL
Qty: 90 TABLET | Refills: 1 | Status: SHIPPED | OUTPATIENT
Start: 2025-07-03

## 2025-07-03 NOTE — PROGRESS NOTES
Name: Deepa Wheeler      : 1975      MRN: [id number]  Encounter Provider: Jesus Balbuena DO  Encounter Date: 7/3/2025   Encounter department: [facility]  :  Assessment & Plan  Back pain, unspecified back location, unspecified back pain laterality, unspecified chronicity  Recurrent  Suggest PT and HEP  Consider dry needling, TENS  Px mgmt or ortho if no better  Orders:    XR spine lumbar minimum 4 views non injury; Future    XR spine thoracic 3 vw; Future    XR spine cervical complete 4 or 5 vw non injury; Future    Ambulatory referral to Physical Therapy; Future    Muscle spasm  Baclofen prn  Sedation risk aware      Orders:    baclofen 10 mg tablet; Take 1 tablet (10 mg total) by mouth daily at bedtime as needed for muscle spasms    Severe major depressive disorder (HCC)  Stable on regimen  F/u psychiatry  Interaction risks advised              History of Present Illness   HPI  Years per pt  Neck/back issues  Flare up several times per year  HEP and chiropractor in past  Right trapezius especially tender  PT in past for knee not neck/back  No px mgmt in past  TENS in past for low back  No triggers known, maybe stress  Left arm tingles at times  No imbalance or gait difficulty    Review of Systems   Constitutional:  Negative for chills and fever.   Musculoskeletal:  Positive for myalgias and neck pain.     Family History[1] was reviewed  Social History[2] was reviewed    Current Outpatient Medications   Medication Instructions    acyclovir (ZOVIRAX) 5 % ointment Apply to lips and around mouth 5 times per day for 4 days initiate as soon as possible after first signs and symptoms    Ashwagandha 120 MG CAPS Take by mouth    baclofen 10 mg, Oral, Daily at bedtime PRN    buPROPion (WELLBUTRIN XL) 300 mg, Oral, Every morning    busPIRone (BUSPAR) 15 mg, Oral, 3 times daily    cholecalciferol (VITAMIN D3) 400 Units, Daily    Lactobacillus (Acidophilus) 100 MG CAPS Take by mouth    lisdexamfetamine  "(VYVANSE) 40 mg, Oral, Every morning    LYSINE PO Take by mouth    magnesium 325 mg, Daily    Omega-3 Fatty Acids (OMEGA 3 500 PO) Take by mouth    valACYclovir (VALTREX) 1,000 mg tablet 2 tabs at earliest onset of cold sore, repeat once in 12 hours     >>>>>>>>  No follow-ups on file.  >>>>>>>>    [POCT]  No results found for this or any previous visit (from the past 24 hours).    Visit Vitals  /78   Pulse 102   Temp (!) 97 °F (36.1 °C)   Resp 18   Ht 5' 8\" (1.727 m)   Wt 108 kg (237 lb)   SpO2 98%   BMI 36.04 kg/m²   OB Status Ablation   Smoking Status Former   BSA 2.2 m²        Objective   /78   Pulse 102   Temp (!) 97 °F (36.1 °C)   Resp 18   Ht 5' 8\" (1.727 m)   Wt 108 kg (237 lb)   SpO2 98%   BMI 36.04 kg/m²      Physical Exam  Vitals and nursing note reviewed.   Constitutional:       General: She is not in acute distress.     Appearance: She is well-developed. She is obese. She is not ill-appearing.   HENT:      Head: Normocephalic.      Nose: Nose normal.      Mouth/Throat:      Mouth: Mucous membranes are moist.     Eyes:      Conjunctiva/sclera: Conjunctivae normal.       Cardiovascular:      Rate and Rhythm: Normal rate and regular rhythm.      Heart sounds: No murmur heard.  Pulmonary:      Effort: Pulmonary effort is normal. No respiratory distress.      Breath sounds: No wheezing.   Abdominal:      Palpations: Abdomen is soft.     Musculoskeletal:         General: Tenderness present. No swelling or deformity.      Cervical back: Neck supple.      Comments: Right trapezius tenderness  B/l ropey posterior para cervical changes     Skin:     General: Skin is warm and dry.      Coloration: Skin is not pale.      Findings: No rash.     Neurological:      General: No focal deficit present.      Mental Status: She is alert.      Motor: No weakness.      Gait: Gait normal.      Deep Tendon Reflexes: Reflexes normal.     Psychiatric:         Mood and Affect: Mood normal.         Behavior: " Behavior normal.         Thought Content: Thought content normal.                [1]   Family History  Problem Relation Name Age of Onset    Brain Aneurysm Mother      Cerebral aneurysm Mother      Breast cancer Maternal Grandmother Dina Wheeler     Arthritis Maternal Grandmother Dina Wheeler     Cancer Maternal Grandmother Dina Wheeler     Brain cancer Family     [2]   Social History  Tobacco Use    Smoking status: Former     Current packs/day: 0.00     Average packs/day: 1 pack/day for 27.6 years (27.6 ttl pk-yrs)     Types: Cigarettes     Start date: 10/31/1991     Quit date: 2019     Years since quittin.0    Smokeless tobacco: Never   Vaping Use    Vaping status: Never Used   Substance Use Topics    Alcohol use: Yes     Alcohol/week: 1.0 standard drink of alcohol     Types: 1 Glasses of wine per week     Comment: Occasionally    Drug use: Never

## 2025-07-08 ENCOUNTER — APPOINTMENT (OUTPATIENT)
Dept: LAB | Facility: HOSPITAL | Age: 50
End: 2025-07-08
Attending: STUDENT IN AN ORGANIZED HEALTH CARE EDUCATION/TRAINING PROGRAM
Payer: COMMERCIAL

## 2025-07-08 DIAGNOSIS — R73.03 PREDIABETES: ICD-10-CM

## 2025-07-08 DIAGNOSIS — D51.8 OTHER VITAMIN B12 DEFICIENCY ANEMIA: ICD-10-CM

## 2025-07-08 DIAGNOSIS — Z00.6 ENCOUNTER FOR EXAMINATION FOR NORMAL COMPARISON OR CONTROL IN CLINICAL RESEARCH PROGRAM: ICD-10-CM

## 2025-07-08 DIAGNOSIS — E55.9 VITAMIN D INSUFFICIENCY: ICD-10-CM

## 2025-07-08 LAB
25(OH)D3 SERPL-MCNC: 23 NG/ML (ref 30–100)
EST. AVERAGE GLUCOSE BLD GHB EST-MCNC: 123 MG/DL
HBA1C MFR BLD: 5.9 %
VIT B12 SERPL-MCNC: 229 PG/ML (ref 180–914)

## 2025-07-08 PROCEDURE — 82607 VITAMIN B-12: CPT

## 2025-07-08 PROCEDURE — 36415 COLL VENOUS BLD VENIPUNCTURE: CPT

## 2025-07-08 PROCEDURE — 82306 VITAMIN D 25 HYDROXY: CPT

## 2025-07-08 PROCEDURE — 83036 HEMOGLOBIN GLYCOSYLATED A1C: CPT

## 2025-07-11 DIAGNOSIS — F90.2 ATTENTION DEFICIT HYPERACTIVITY DISORDER (ADHD), COMBINED TYPE: ICD-10-CM

## 2025-07-11 RX ORDER — LISDEXAMFETAMINE DIMESYLATE 50 MG/1
50 CAPSULE ORAL EVERY MORNING
Qty: 14 CAPSULE | Refills: 0 | Status: SHIPPED | OUTPATIENT
Start: 2025-07-11 | End: 2025-07-25

## 2025-07-14 ENCOUNTER — EVALUATION (OUTPATIENT)
Dept: PHYSICAL THERAPY | Facility: CLINIC | Age: 50
End: 2025-07-14
Attending: FAMILY MEDICINE
Payer: COMMERCIAL

## 2025-07-14 ENCOUNTER — OFFICE VISIT (OUTPATIENT)
Dept: PSYCHIATRY | Facility: CLINIC | Age: 50
End: 2025-07-14
Payer: COMMERCIAL

## 2025-07-14 DIAGNOSIS — F90.2 ATTENTION DEFICIT HYPERACTIVITY DISORDER (ADHD), COMBINED TYPE: ICD-10-CM

## 2025-07-14 DIAGNOSIS — M54.9 BACK PAIN, UNSPECIFIED BACK LOCATION, UNSPECIFIED BACK PAIN LATERALITY, UNSPECIFIED CHRONICITY: Primary | ICD-10-CM

## 2025-07-14 DIAGNOSIS — F43.10 PTSD (POST-TRAUMATIC STRESS DISORDER): ICD-10-CM

## 2025-07-14 DIAGNOSIS — F33.1 RECURRENT MAJOR DEPRESSIVE EPISODES, MODERATE (HCC): Primary | ICD-10-CM

## 2025-07-14 DIAGNOSIS — F41.1 GENERALIZED ANXIETY DISORDER: ICD-10-CM

## 2025-07-14 PROCEDURE — 97161 PT EVAL LOW COMPLEX 20 MIN: CPT | Performed by: PHYSICAL THERAPIST

## 2025-07-14 PROCEDURE — 99214 OFFICE O/P EST MOD 30 MIN: CPT | Performed by: PHYSICIAN ASSISTANT

## 2025-07-14 PROCEDURE — 97110 THERAPEUTIC EXERCISES: CPT | Performed by: PHYSICAL THERAPIST

## 2025-07-14 NOTE — PROGRESS NOTES
PT Evaluation     Today's date: 2025  Patient name: Deepa Wheeler  : 1975  MRN: [id number]  Referring provider: Jesus Balbuena DO  Dx:   Encounter Diagnosis     ICD-10-CM    1. Back pain, unspecified back location, unspecified back pain laterality, unspecified chronicity  M54.9 Ambulatory referral to Physical Therapy     PT plan of care cert/re-cert          Start Time: 0800  Stop Time: 0845  Total time in clinic (min): 45 minutes    Assessment  Impairments: abnormal or restricted ROM, abnormal movement, impaired physical strength, lacks appropriate home exercise program, pain with function and poor posture   Symptom irritability: low    Assessment details: Deepa Wheeler is a 50 y.o. female who presents with complaints of right neck/shoulder pain.  No further referral appears necessary at this time based upon examination results.  Patient is presenting with joint hypomobility and decreased cervical rotation leading to limitations with working, driving, caring for 2 year old grand-daughter.  Prognosis is good given HEP compliance and PT 1-2x/wk.  Positive prognostic indicators include positive attitude toward recovery.   Please contact me if you have any questions or recommendations.  Thank you for the opportunity to share in Deepa's care.       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 No sleep disturbances reported. Patient  reporting burning and aching right shoulder and sciatica left side. Pain also reported with right arm usage and with using computer mouse down to right elbow. Had massage a few weeks week which helped and uses muscle relaxer at night time. Patient denies dizziness, dsyphagia, dysarthria, diplopia, drop attacks, nystagmus, facial numbness, nor nausea. Pain also reported when carrying her 2 year old grand-daughter. Patient is RHD. Some discomfort when turning head to the right.     Patient reports left sided sciatica that comes and goes. Around  she had trouble walking due to pain. Had chiropractic care which helped with stretching. Has not been bothering lately. Low back has been doing well lately as well.               Recurrent probem    Quality of life: good    Patient Goals  Patient goals for therapy: decreased pain, increased strength and increased motion    Pain  Current pain ratin  At best pain ratin  At worst pain ratin  Relieving factors: ice and medications  Progression: no change    Treatments  Previous treatment: medication  Current treatment: medication    Short Term:  Pt will report decreased levels of pain by at least 2 subjective ratings in 4 weeks  Pt will demonstrate improved ROM by at least 10 degrees in 4 weeks  Pt will demonstrate improved strength by 1/2 grade in 4 weeks.  Pt will be able to be able to look over right shoulder without pain in 4 weeks  Long Term:   Pt will be independent in their HEP in 8 weeks  Pt will be pain free with IADL's in 8 weeks.  Pt will report no issues with working in 8 weeks.  Pt will be able to demonstrate full cervical AROM in 8 weeks       Objective    Cervical % of normal   Flex. 100   Extn. 75   SB Left 75   SB Right 75   ROT Left 100   ROT Right 75   Repetitive testing:  - retraction = no change  - retraction w/extension = slight improvement  - flexion = no effect             MMT           AROM    Shoulder       R       L         R         L    Flex. 4 4 WNL WNL   Extn. 4 4     Abd. 4 4 WNL WNL   Add.       IR. 4 4     ER. 4 4                  MMT    Elbow       R       L   Flex. 5 5   Extn. 5 5              MMT    Wrist       R         L   Flex. 5 5   Extn. 5 5    bent 5 5     Head positioning: slight forward head posture    Vertebral artery test=  - sharp eliza test=   - Spurling’s= -       Cervical joint mobility: decreased C6-T2       Insurance:  AMA/CMS Eval/ Re-eval POC expires Auth #/ Referral # Total    Start date  Expiration date Extension  Visit limitation?  PT only or  PT+OT? Co-Insurance   CMS 7.14.25 9.8.25 Not needed     30 PCY                  Precautions: standard  Patient provided verbal consent to treatment plan and recommended interventions.      Manuals 7.14        visit 1        CT-junction gr.5 FB                          Neuro Re-Ed                                                                        Ther Ex         Rep retr w/extn 2x10        Rep retraction 1x10                                                              Ther Activity         PT ed FB

## 2025-07-14 NOTE — PSYCH
"MEDICATION MANAGEMENT NOTE    Name: Deepa Wheeler      : 1975      MRN: [id number]  Encounter Provider: Rose Christian  Encounter Date: 2025   Encounter department: [facility]    Insurance: Payor: [employer]my [employer] [place] / Plan: Write.my [facility] OMNIA / Product Type: Blue Fee for Service /      Reason for Visit:   Chief Complaint   Patient presents with    Follow-up    Medication Management    Depression    Anxiety    ADHD   :  Assessment & Plan  Recurrent major depressive episodes, moderate (HCC)  Not at goal - continue bupropion  mg qAM, buspirone 15 mg TID; continue talk therapy; f/u in 1 month         Generalized anxiety disorder  Not at goal - continue bupropion  mg qAM, buspirone 15 mg TID; continue talk therapy; f/u in 1 month        PTSD (post-traumatic stress disorder)  Not at goal - continue bupropion  mg qAM, buspirone 15 mg TID; continue talk therapy; f/u in 1 month        Attention deficit hyperactivity disorder (ADHD), combined type  Not at goal - continue Vyvanse 50 mg qAM; f/u in 1 month           Today is her first day on Vyvanse 50 mg qAM and she feels that it has been a little more helpful for focus so far but she did have reduced appetite and feels a little \"wound up\". She admits that she felt this way first day of the 40 mg dose as well but this resolved after a couple of days. For now no changes in medications advised.     Treatment Recommendations:    Continue current medications:     - bupropion  mg qAM     - buspirone 15 mg TID     - Vyvanse 50 mg qAM    Educated about diagnosis and treatment modalities. Verbalizes understanding and agreement with the treatment plan.  Discussed self monitoring of symptoms, and symptom monitoring tools.  Discussed medications and if treatment adjustment was needed or desired.  Medication management every 4 weeks  Aware of 24 hour and weekend coverage for urgent situations accessed by " "calling [facility] main practice number  Continue psychotherapy with [facility] therapist Nany Vanegas  I am scheduling this patient out for greater than 3 months: No    Medications Risks/Benefits:      Risks, Benefits And Possible Side Effects Of Medications:    Risks, benefits, and possible side effects of medications explained to Deepa and she (or legal representative) verbalizes understanding and agreement for treatment.    Controlled Medication Discussion:     Deepa has been filling controlled prescriptions on time as prescribed according to [place] Prescription Drug Monitoring Program.  Discussed with Deepa the risks of impairment of ability to drive and potential for abuse and addiction related to treatment with stimulant medications. She understands risk of treatment with stimulant medications, agrees to not drive if feels impaired and agrees to take medications as prescribed.  Deepa is using medication appropriately.  Controlled substance contract was discussed with [employer] and signed on 6/16/25      History of Present Illness     Deepa is seen today for a follow up for Follow-up, Medication Management, Depression, Anxiety, and ADHD and PTSD. Today was her first day with the Vyvanse 50 mg qAM and she feels it did help her focus more than previous doses. She does feel a little \"wound up\" but notes she felt the same way the first day of the 40 mg dose and this resolved within a couple of days. Typically her sleep is good. Her appetite was lower today than usual. She feels mood and anxiety are under decent control. She does not need to watch her granddaughter as often as previously which has reduced her stress as well.     Past medication trials: Prozac (weight gain), trazodone, Abilify (weight gain), Lamictal, Valium, Edluar (\"drugged feeling\")     She denies any suicidal ideation, intent or plan at present; denies any homicidal ideation, intent or plan at present.    She denies " any auditory hallucinations, denies any visual hallucinations, denies any delusions.    She denies any side effects from current psychiatric medications.    HPI ROS Appetite Changes and Sleep:     She reports normal sleep, normal appetite, normal energy level    Review Of Systems: A review of systems is obtained and is negative except for the pertinent positives listed in HPI/Subjective above.      Current Rating Scores:     None completed today.    Areas of Improvement: reviewed in HPI/Subjective Section and reviewed in Assessment and Plan Section      Past Medical History[1]  Past Surgical History[2]  Allergies: Allergies[3]    Current Outpatient Medications   Medication Instructions    acyclovir (ZOVIRAX) 5 % ointment Apply to lips and around mouth 5 times per day for 4 days initiate as soon as possible after first signs and symptoms    Ashwagandha 120 MG CAPS Take by mouth    baclofen 10 mg, Oral, Daily at bedtime PRN    buPROPion (WELLBUTRIN XL) 300 mg, Oral, Every morning    busPIRone (BUSPAR) 15 mg, Oral, 3 times daily    cholecalciferol (VITAMIN D3) 400 Units, Daily    Lactobacillus (Acidophilus) 100 MG CAPS Take by mouth    lisdexamfetamine (VYVANSE) 50 mg, Oral, Every morning    LYSINE PO Take by mouth    magnesium 325 mg, Daily    Omega-3 Fatty Acids (OMEGA 3 500 PO) Take by mouth    valACYclovir (VALTREX) 1,000 mg tablet 2 tabs at earliest onset of cold sore, repeat once in 12 hours        Substance Abuse History:    Tobacco, Alcohol and Drug Use History     Tobacco Use    Smoking status: Former     Current packs/day: 0.00     Average packs/day: 1 pack/day for 27.6 years (27.6 ttl pk-yrs)     Types: Cigarettes     Start date: 10/31/1991     Quit date: 2019     Years since quittin.0    Smokeless tobacco: Never   Vaping Use    Vaping status: Never Used   Substance Use Topics    Alcohol use: Yes     Alcohol/week: 1.0 standard drink of alcohol     Types: 1 Glasses of wine per week     Comment:  Occasionally    Drug use: Never          Social History:    Social History     Socioeconomic History    Marital status: Single     Spouse name: Not on file    Number of children: Not on file    Years of education: Not on file    Highest education level: Not on file   Occupational History    Not on file   Other Topics Concern    Not on file   Social History Narrative    Not on file        Family Psychiatric History:     Family History[4]    Medical History Reviewed by provider this encounter:  Tobacco  Allergies  Meds  Problems  Med Hx  Surg Hx  Fam Hx          Objective   There were no vitals taken for this visit.     Mental Status Evaluation:    Appearance age appropriate, casually dressed   Behavior cooperative, calm   Speech normal rate, normal volume, normal pitch, spontaneous   Mood euthymic   Affect normal range and intensity, appropriate   Thought Processes organized, goal directed   Thought Content no overt delusions   Perceptual Disturbances: no auditory hallucinations, no visual hallucinations   Abnormal Thoughts  Risk Potential Suicidal ideation - None  Homicidal ideation - None  Potential for aggression - No   Orientation oriented to person, place, time/date, and situation   Memory recent and remote memory grossly intact   Consciousness alert and awake   Attention Span Concentration Span attention span and concentration appear shorter than expected for age   Intellect appears to be of average intelligence   Insight intact   Judgement intact   Muscle Strength and  Gait normal muscle strength and normal muscle tone, normal gait and normal balance   Motor activity no abnormal movements   Language no difficulty naming common objects, no difficulty repeating a phrase, no difficulty writing a sentence   Fund of Knowledge adequate knowledge of current events  adequate fund of knowledge regarding past history  adequate fund of knowledge regarding vocabulary        Laboratory Results: I have personally  "reviewed all pertinent laboratory/tests results    Suicide/Homicide Risk Assessment:    Risk of Harm to Self:  The following ratings are based on assessment at the time of the interview  Based on today's assessment, Deepa presents the following risk of harm to self: none    Risk of Harm to Others:  The following ratings are based on assessment at the time of the interview  Based on today's assessment, Deepa presents the following risk of harm to others: none    The following interventions are recommended: Continue medication management. No other intervention changes indicated at this time.    Psychotherapy Provided:     Individual psychotherapy provided: No    Treatment Plan:    Completed and signed during the session: Not applicable - Treatment Plan to be completed by [facility] therapist.    Goals: Progress towards Treatment Plan goals - Yes, progressing, as evidenced by subjective findings in HPI/Subjective Section and in Assessment and Plan Section    Depression Follow-up Plan Completed: Not applicable    Note Share:    This note was shared with patient.    Visit Time  Visit Start Time: 5:00 PM  Visit Stop Time: 5:15 PM  Total Visit Duration: 15 minutes    Portions of the record may have been created with voice recognition software. Occasional wrong word or \"sound a like\" substitutions may have occurred due to the inherent limitations of voice recognition software. Read the chart carefully and recognize, using context, where substitutions have occurred.    Rose Christian 07/14/25       [1]   Past Medical History:  Diagnosis Date    Abnormal Pap smear of cervix 2015    Allergic 1989    Anxiety 1990    Arthritis 2024    Blepharitis     Breast lump     last assessed: 3/18/2014    Depression 2011    EBV seropositivity     last assessed: 3/18/2016    Herpes simplex infection     last assessed: 8/19/2014    Hirsutism     HPV (human papilloma virus) infection 2015    Malaise and fatigue     " last assessed: 3/18/2016    Multiple joint pain     last assessed: 3/1/2016    Myalgia     last assessed: 3/1/2016    Trauma     Since childhood   [2]   Past Surgical History:  Procedure Laterality Date    BREAST BIOPSY  2010    Benign    BREAST FIBROADENOMA SURGERY      COLONOSCOPY  2004    COLPOSCOPY  2003 ??    Hemorrhoids    HYSTEROSCOPY W/ ENDOMETRIAL ABLATION      TOOTH EXTRACTION      TUBAL LIGATION     [3]   Allergies  Allergen Reactions    Acetaminophen Other (See Comments)    Hydrocodone Other (See Comments)    Hydrocodone-Acetaminophen Abdominal Pain    Penicillins Itching   [4]   Family History  Problem Relation Name Age of Onset    Brain Aneurysm Mother      Cerebral aneurysm Mother      Breast cancer Maternal Grandmother Dina Gunjanthai     Arthritis Maternal Grandmother Dina Rotthai     Cancer Maternal Grandmother Dina Gunjanthai     Brain cancer Family

## 2025-07-17 ENCOUNTER — APPOINTMENT (OUTPATIENT)
Dept: PHYSICAL THERAPY | Facility: CLINIC | Age: 50
End: 2025-07-17
Attending: FAMILY MEDICINE
Payer: COMMERCIAL

## 2025-07-18 ENCOUNTER — SOCIAL WORK (OUTPATIENT)
Dept: BEHAVIORAL/MENTAL HEALTH CLINIC | Facility: CLINIC | Age: 50
End: 2025-07-18
Payer: COMMERCIAL

## 2025-07-18 ENCOUNTER — TELEPHONE (OUTPATIENT)
Age: 50
End: 2025-07-18

## 2025-07-18 DIAGNOSIS — F41.1 GENERALIZED ANXIETY DISORDER: ICD-10-CM

## 2025-07-18 DIAGNOSIS — F43.10 PTSD (POST-TRAUMATIC STRESS DISORDER): ICD-10-CM

## 2025-07-18 DIAGNOSIS — F90.2 ATTENTION DEFICIT HYPERACTIVITY DISORDER (ADHD), COMBINED TYPE: ICD-10-CM

## 2025-07-18 DIAGNOSIS — F33.1 RECURRENT MAJOR DEPRESSIVE EPISODES, MODERATE (HCC): Primary | ICD-10-CM

## 2025-07-18 LAB
APOB+LDLR+PCSK9 GENE MUT ANL BLD/T: NOT DETECTED
BRCA1+BRCA2 DEL+DUP + FULL MUT ANL BLD/T: NOT DETECTED
MLH1+MSH2+MSH6+PMS2 GN DEL+DUP+FUL M: NOT DETECTED

## 2025-07-18 PROCEDURE — 90834 PSYTX W PT 45 MINUTES: CPT
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
## 2025-07-18 NOTE — TELEPHONE ENCOUNTER
Patient called in stating they would be about 10 minutes late for their talk therapy appointment today, 7/18 @10am in the [place] office.    Writer stated therapist and office staff would be made aware.

## 2025-07-22 ENCOUNTER — OFFICE VISIT (OUTPATIENT)
Dept: PHYSICAL THERAPY | Facility: CLINIC | Age: 50
End: 2025-07-22
Attending: FAMILY MEDICINE
Payer: COMMERCIAL

## 2025-07-22 DIAGNOSIS — M54.9 BACK PAIN, UNSPECIFIED BACK LOCATION, UNSPECIFIED BACK PAIN LATERALITY, UNSPECIFIED CHRONICITY: Primary | ICD-10-CM

## 2025-07-22 PROCEDURE — 97140 MANUAL THERAPY 1/> REGIONS: CPT | Performed by: PHYSICAL THERAPIST

## 2025-07-22 PROCEDURE — 97110 THERAPEUTIC EXERCISES: CPT | Performed by: PHYSICAL THERAPIST

## 2025-07-22 NOTE — PROGRESS NOTES
Daily Note     Today's date: 2025  Patient name: Deepa Wheeler  : 1975  MRN: [id number]  Referring provider: Jesus Balbuena DO  Dx:   Encounter Diagnosis     ICD-10-CM    1. Back pain, unspecified back location, unspecified back pain laterality, unspecified chronicity  M54.9       Start Time: 1322  Stop Time: 1352  Total time in clinic (min): 30 minutes    Subjective: Patient reports some right upper trap/cervical discomfort when tilting head to the right. Reports some pulling when looking to her right.     Objective: See treatment diary below    Assessment: Tolerated treatment well. Patient demonstrated improved rotation and felt better with lateral flexion to right after manual intervention and repeated motions with self OP to the right. Added this to HEP.     Plan: Continue per plan of care.        Insurance:  AMA/CMS Eval/ Re-eval POC expires Auth #/ Referral # Total    Start date  Expiration date Extension  Visit limitation?  PT only or  PT+OT? Co-Insurance   CMS 7.. 9.8.25 Not needed     30 PCY                  Precautions: standard  Patient provided verbal consent to treatment plan and recommended interventions.      Manuals        visit 1 2       CT-junction gr.5 FB        C4-5 mobs  3x30'' ea. side       Right mid scalene MFR  FB       Neuro Re-Ed                                                                        Ther Ex         Rep retr w/extn 2x10 10x       Rep retraction 1x10 10x       T-spine extn  10x       Rep. Left lat flexion with OP  2x10                                           Ther Activity         PT ed FB

## 2025-07-25 DIAGNOSIS — F90.2 ATTENTION DEFICIT HYPERACTIVITY DISORDER (ADHD), COMBINED TYPE: ICD-10-CM

## 2025-07-25 RX ORDER — LISDEXAMFETAMINE DIMESYLATE 50 MG/1
50 CAPSULE ORAL EVERY MORNING
Qty: 30 CAPSULE | Refills: 0 | Status: SHIPPED | OUTPATIENT
Start: 2025-07-25 | End: 2025-08-24

## 2025-07-25 NOTE — TELEPHONE ENCOUNTER
Patient states she wants to stay at 50 mg for now.     Reason for call:   [x] Refill   [] Prior Auth  [] Other:     Office:   [] PCP/Provider -   [x] Specialty/Provider - Anahi Collado    Medication:   Vyvanse 50 mg, 1 qd, 30    Pharmacy:   [facility]   Does the patient have enough for 3 days?   [] Yes   [x] No - Send as HP to POD    Mail Away Pharmacy   Does the patient have enough for 10 days?   [] Yes   [] No - Send as HP to POD

## 2025-07-28 ENCOUNTER — OFFICE VISIT (OUTPATIENT)
Dept: PHYSICAL THERAPY | Facility: CLINIC | Age: 50
End: 2025-07-28
Attending: FAMILY MEDICINE
Payer: COMMERCIAL

## 2025-07-28 DIAGNOSIS — M54.9 BACK PAIN, UNSPECIFIED BACK LOCATION, UNSPECIFIED BACK PAIN LATERALITY, UNSPECIFIED CHRONICITY: Primary | ICD-10-CM

## 2025-07-28 PROCEDURE — 97530 THERAPEUTIC ACTIVITIES: CPT | Performed by: PHYSICAL THERAPIST

## 2025-07-28 PROCEDURE — 97110 THERAPEUTIC EXERCISES: CPT | Performed by: PHYSICAL THERAPIST

## 2025-08-01 ENCOUNTER — TELEPHONE (OUTPATIENT)
Dept: PSYCHIATRY | Facility: CLINIC | Age: 50
End: 2025-08-01

## 2025-08-07 ENCOUNTER — OFFICE VISIT (OUTPATIENT)
Dept: PHYSICAL THERAPY | Facility: CLINIC | Age: 50
End: 2025-08-07
Attending: FAMILY MEDICINE
Payer: COMMERCIAL

## 2025-08-07 DIAGNOSIS — M54.9 BACK PAIN, UNSPECIFIED BACK LOCATION, UNSPECIFIED BACK PAIN LATERALITY, UNSPECIFIED CHRONICITY: Primary | ICD-10-CM

## 2025-08-07 PROCEDURE — 97110 THERAPEUTIC EXERCISES: CPT | Performed by: PHYSICAL THERAPIST

## 2025-08-11 ENCOUNTER — TELEPHONE (OUTPATIENT)
Dept: PSYCHIATRY | Facility: CLINIC | Age: 50
End: 2025-08-11

## 2025-08-20 ENCOUNTER — TELEMEDICINE (OUTPATIENT)
Dept: PSYCHIATRY | Facility: CLINIC | Age: 50
End: 2025-08-20
Payer: COMMERCIAL

## 2025-08-20 ENCOUNTER — SOCIAL WORK (OUTPATIENT)
Dept: BEHAVIORAL/MENTAL HEALTH CLINIC | Facility: CLINIC | Age: 50
End: 2025-08-20
Payer: COMMERCIAL

## 2025-08-20 DIAGNOSIS — F43.10 PTSD (POST-TRAUMATIC STRESS DISORDER): ICD-10-CM

## 2025-08-20 DIAGNOSIS — F41.1 GENERALIZED ANXIETY DISORDER: ICD-10-CM

## 2025-08-20 DIAGNOSIS — F90.2 ATTENTION DEFICIT HYPERACTIVITY DISORDER (ADHD), COMBINED TYPE: ICD-10-CM

## 2025-08-20 DIAGNOSIS — F33.1 RECURRENT MAJOR DEPRESSIVE EPISODES, MODERATE (HCC): Primary | ICD-10-CM

## 2025-08-20 DIAGNOSIS — F33.1 RECURRENT MAJOR DEPRESSIVE EPISODES, MODERATE (HCC): ICD-10-CM

## 2025-08-20 DIAGNOSIS — F90.2 ATTENTION DEFICIT HYPERACTIVITY DISORDER (ADHD), COMBINED TYPE: Primary | ICD-10-CM

## 2025-08-20 PROCEDURE — 99214 OFFICE O/P EST MOD 30 MIN: CPT | Performed by: PHYSICIAN ASSISTANT

## 2025-08-20 PROCEDURE — 90834 PSYTX W PT 45 MINUTES: CPT | Performed by: SOCIAL WORKER

## 2025-08-20 RX ORDER — LISDEXAMFETAMINE DIMESYLATE 40 MG/1
40 CAPSULE ORAL EVERY MORNING
Qty: 30 CAPSULE | Refills: 0 | Status: SHIPPED | OUTPATIENT
Start: 2025-08-20 | End: 2025-09-19